# Patient Record
Sex: FEMALE | Race: WHITE | HISPANIC OR LATINO | Employment: FULL TIME | URBAN - METROPOLITAN AREA
[De-identification: names, ages, dates, MRNs, and addresses within clinical notes are randomized per-mention and may not be internally consistent; named-entity substitution may affect disease eponyms.]

---

## 2023-02-23 PROBLEM — F41.9 ANXIETY: Status: ACTIVE | Noted: 2023-02-23

## 2023-02-23 PROBLEM — T75.3XXA MOTION SICKNESS: Status: ACTIVE | Noted: 2023-02-23

## 2023-02-23 PROBLEM — E66.811 CLASS 1 OBESITY WITH BODY MASS INDEX (BMI) OF 32.0 TO 32.9 IN ADULT: Status: ACTIVE | Noted: 2023-02-23

## 2023-02-23 PROBLEM — E78.5 HYPERLIPIDEMIA: Status: ACTIVE | Noted: 2023-02-23

## 2023-02-23 PROBLEM — R07.89 ATYPICAL CHEST PAIN: Status: ACTIVE | Noted: 2023-02-23

## 2023-02-23 PROBLEM — E55.9 VITAMIN D DEFICIENCY: Status: ACTIVE | Noted: 2023-02-23

## 2023-02-23 PROBLEM — J30.9 ALLERGIC RHINITIS: Status: ACTIVE | Noted: 2023-02-23

## 2023-02-23 PROBLEM — E66.9 CLASS 1 OBESITY WITH BODY MASS INDEX (BMI) OF 32.0 TO 32.9 IN ADULT: Status: ACTIVE | Noted: 2023-02-23

## 2023-02-23 PROBLEM — L70.9 ACNE: Status: ACTIVE | Noted: 2023-02-23

## 2023-02-23 RX ORDER — SCOLOPAMINE TRANSDERMAL SYSTEM 1 MG/1
1 PATCH, EXTENDED RELEASE TRANSDERMAL
COMMUNITY
End: 2024-03-15 | Stop reason: SDUPTHER

## 2023-02-23 RX ORDER — BUPROPION HYDROCHLORIDE 150 MG/1
1 TABLET ORAL DAILY
COMMUNITY
End: 2023-03-06 | Stop reason: SINTOL

## 2023-02-23 RX ORDER — FLUTICASONE PROPIONATE 50 MCG
2 SPRAY, SUSPENSION (ML) NASAL DAILY
COMMUNITY
Start: 2018-05-30

## 2023-03-05 NOTE — PROGRESS NOTES
Subjective   Patient ID: Eda Sprague is a 37 y.o. female who presents for No chief complaint on file..    HPI     Review of Systems    Objective   There were no vitals taken for this visit.    Physical Exam    Assessment/Plan

## 2023-03-06 ENCOUNTER — OFFICE VISIT (OUTPATIENT)
Dept: PRIMARY CARE | Facility: CLINIC | Age: 38
End: 2023-03-06
Payer: COMMERCIAL

## 2023-03-06 VITALS
BODY MASS INDEX: 31.07 KG/M2 | WEIGHT: 217 LBS | SYSTOLIC BLOOD PRESSURE: 117 MMHG | OXYGEN SATURATION: 98 % | HEIGHT: 70 IN | DIASTOLIC BLOOD PRESSURE: 79 MMHG | HEART RATE: 88 BPM

## 2023-03-06 DIAGNOSIS — R53.83 FATIGUE, UNSPECIFIED TYPE: ICD-10-CM

## 2023-03-06 DIAGNOSIS — R00.0 TACHYCARDIA, UNSPECIFIED: Primary | ICD-10-CM

## 2023-03-06 PROCEDURE — 1036F TOBACCO NON-USER: CPT | Performed by: NURSE PRACTITIONER

## 2023-03-06 PROCEDURE — 99214 OFFICE O/P EST MOD 30 MIN: CPT | Performed by: NURSE PRACTITIONER

## 2023-03-06 ASSESSMENT — ENCOUNTER SYMPTOMS: DEPRESSION: 0

## 2023-03-06 NOTE — PATIENT INSTRUCTIONS
Call Cardiology [phone] for appointment  Recommend ambulatory ECG monitor x14 days  Follow-up as scheduled

## 2023-03-06 NOTE — PROGRESS NOTES
"Subjective   Patient ID: Eda Sprague is a 37 y.o. female who presents for episode of tachycardia while at [employer] with her children.  Watch showed heart rate in 130s.  States this has happened before.  Does feel tired with the episodes.  Will often nap on most days that this occurs.  Also reports vague chest discomfort on either side of chest, possibly related to anxiety.    Drinks approximately 3 cups of coffee in the morning.  Otherwise water.  No energy drinks  Otherwise stays away from caffeine.  HPI     Review of Systems   All other systems reviewed and are negative.      Objective   /79   Pulse 88   Ht 1.778 m (5' 10\")   Wt 98.4 kg (217 lb)   SpO2 98%   BMI 31.14 kg/m²     Physical Exam  Constitutional:       Appearance: Normal appearance.   Cardiovascular:      Rate and Rhythm: Normal rate and regular rhythm.      Heart sounds: No murmur heard.  Pulmonary:      Effort: Pulmonary effort is normal.   Neurological:      Mental Status: She is alert.         Assessment/Plan   Problem List Items Addressed This Visit    None  Visit Diagnoses       Tachycardia, unspecified    -  Primary    Relevant Orders    Referral to Cardiology    Fatigue, unspecified type                   "

## 2023-03-09 ENCOUNTER — APPOINTMENT (OUTPATIENT)
Dept: PRIMARY CARE | Facility: CLINIC | Age: 38
End: 2023-03-09
Payer: COMMERCIAL

## 2023-03-17 DIAGNOSIS — R92.8 ABNORMAL MAMMOGRAM: Primary | ICD-10-CM

## 2023-03-17 NOTE — PROGRESS NOTES
Spoke with patient regarding mammogram results.  Understands she needs ultrasound of left breast  Order for ultrasound placed and number given to patient for scheduling

## 2023-03-22 ENCOUNTER — APPOINTMENT (OUTPATIENT)
Dept: PRIMARY CARE | Facility: CLINIC | Age: 38
End: 2023-03-22
Payer: COMMERCIAL

## 2023-03-24 ENCOUNTER — APPOINTMENT (OUTPATIENT)
Dept: PRIMARY CARE | Facility: CLINIC | Age: 38
End: 2023-03-24
Payer: COMMERCIAL

## 2023-04-10 DIAGNOSIS — N63.20 MASS OF MULTIPLE SITES OF LEFT BREAST: Primary | ICD-10-CM

## 2023-04-10 NOTE — PROGRESS NOTES
Spoke with Eda.  She is aware she needs US guided biopsy following recent US.  They will be calling to schedule biopsy at [facility].

## 2023-11-26 DIAGNOSIS — N63.20 MASS OF LEFT BREAST, UNSPECIFIED QUADRANT: Primary | ICD-10-CM

## 2023-12-08 ENCOUNTER — APPOINTMENT (OUTPATIENT)
Dept: RADIOLOGY | Facility: CLINIC | Age: 38
End: 2023-12-08
Payer: COMMERCIAL

## 2024-01-29 ENCOUNTER — HOSPITAL ENCOUNTER (OUTPATIENT)
Dept: RADIOLOGY | Facility: CLINIC | Age: 39
Discharge: HOME | End: 2024-01-29
Payer: COMMERCIAL

## 2024-01-29 DIAGNOSIS — N63.20 MASS OF LEFT BREAST, UNSPECIFIED QUADRANT: ICD-10-CM

## 2024-01-29 PROCEDURE — 76642 ULTRASOUND BREAST LIMITED: CPT | Mod: LEFT SIDE | Performed by: STUDENT IN AN ORGANIZED HEALTH CARE EDUCATION/TRAINING PROGRAM

## 2024-01-29 PROCEDURE — 76982 USE 1ST TARGET LESION: CPT | Mod: LT

## 2024-01-29 PROCEDURE — 76642 ULTRASOUND BREAST LIMITED: CPT | Mod: LT

## 2024-01-30 DIAGNOSIS — N63.20 MASS OF LEFT BREAST, UNSPECIFIED QUADRANT: Primary | ICD-10-CM

## 2024-03-14 ENCOUNTER — OFFICE VISIT (OUTPATIENT)
Dept: OBSTETRICS AND GYNECOLOGY | Facility: CLINIC | Age: 39
End: 2024-03-14
Payer: COMMERCIAL

## 2024-03-14 VITALS
WEIGHT: 213 LBS | HEIGHT: 70 IN | DIASTOLIC BLOOD PRESSURE: 72 MMHG | BODY MASS INDEX: 30.49 KG/M2 | SYSTOLIC BLOOD PRESSURE: 110 MMHG

## 2024-03-14 DIAGNOSIS — N89.8 VAGINAL DISCHARGE: ICD-10-CM

## 2024-03-14 DIAGNOSIS — N93.9 ABNORMAL UTERINE BLEEDING (AUB): ICD-10-CM

## 2024-03-14 DIAGNOSIS — Z01.419 ENCOUNTER FOR ANNUAL ROUTINE GYNECOLOGICAL EXAMINATION: Primary | ICD-10-CM

## 2024-03-14 PROCEDURE — 99395 PREV VISIT EST AGE 18-39: CPT | Performed by: OBSTETRICS & GYNECOLOGY

## 2024-03-14 PROCEDURE — 1036F TOBACCO NON-USER: CPT | Performed by: OBSTETRICS & GYNECOLOGY

## 2024-03-14 PROCEDURE — 87205 SMEAR GRAM STAIN: CPT

## 2024-03-14 RX ORDER — DESOGESTREL AND ETHINYL ESTRADIOL 0.15-0.03
1 KIT ORAL DAILY
Qty: 84 TABLET | Refills: 3 | Status: SHIPPED | OUTPATIENT
Start: 2024-03-14 | End: 2025-03-14

## 2024-03-14 ASSESSMENT — PATIENT HEALTH QUESTIONNAIRE - PHQ9
1. LITTLE INTEREST OR PLEASURE IN DOING THINGS: NOT AT ALL
SUM OF ALL RESPONSES TO PHQ9 QUESTIONS 1 AND 2: 0
2. FEELING DOWN, DEPRESSED OR HOPELESS: NOT AT ALL

## 2024-03-14 ASSESSMENT — COLUMBIA-SUICIDE SEVERITY RATING SCALE - C-SSRS
1. IN THE PAST MONTH, HAVE YOU WISHED YOU WERE DEAD OR WISHED YOU COULD GO TO SLEEP AND NOT WAKE UP?: NO
6. HAVE YOU EVER DONE ANYTHING, STARTED TO DO ANYTHING, OR PREPARED TO DO ANYTHING TO END YOUR LIFE?: NO
2. HAVE YOU ACTUALLY HAD ANY THOUGHTS OF KILLING YOURSELF?: NO

## 2024-03-14 NOTE — PROGRESS NOTES
"Subjective    Eda Sprague is a 38 y.o. female who is here for a routine exam. Periods are regular. No intermenstrual bleeding, spotting, or discharge Every other month is heavy and painful.  She has tried multiple hormonal options in the past including an IUD without results.    Last pap: 12/2020 Negative, negative HPV  Last mammogram:  11/2023 L US probably benign, biopsy showed fibroadenoma, last mammogram 3/23    Review of Systems  Constitutional: no fever, no chills, no recent weight gain, no recent weight loss and no fatigue.   Eyes: no eye pain, no vision problems and no dryness of the eyes.   ENT: no hearing loss, no nosebleeds and no sinus congestion.   Cardiovascular: no chest pain, no palpitations and no orthopnea.   Respiratory: no shortness of breath, no cough and no wheezing.   Gastrointestinal: no abdominal pain, no constipation, no nausea, no diarrhea and no vomiting.   Genitourinary: no dysuria, no urinary incontinence, no vaginal dryness, no vaginal itching, no dyspareunia, no pelvic pain, no dysmenorrhea, no sexual problems, no change in urinary frequency, no vaginal discharge, no unexplained vaginal bleeding and no lesion/sore.   Musculoskeletal: no back pain, no joint swelling and no leg edema.   Integumentary: no rashes, no skin lesions, no nipple discharge, no breast pain and no breast lump.   Neurological: no headache, no numbness and no dizziness.   Psychiatric: no sleep disturbances, no anxiety and no depression.   Endocrine: no hot flashes, no loss of hair and no hirsutism.   Hematologic/Lymphatic: no swollen glands, no tendency for easy bleeding and no tendency for easy bruising.       Objective   Ht 1.778 m (5' 10\")   Wt 96.6 kg (213 lb)   LMP 02/22/2024   BMI 30.56 kg/m²        General:   Alert and oriented, in no acute distress   Neck: Supple. No visible thyromegaly.    Breast/Axilla: Normal to palpation bilaterally without masses, skin changes, or nipple discharge.  "   Abdomen: Soft, non-tender, without masses or organomegaly   Vulva: Normal architecture without erythema, masses, or lesions.    Vagina: Normal mucosa without lesions, masses, or atrophy. +yellow vaginal discharge.    Cervix: Normal without masses, lesions, or signs of cervicitis.    Uterus: Normal mobile, non-enlarged uterus    Adnexa: Normal without masses or lesions   Pelvic Floor No POP noted. No high tone pelvic floor    Psych Normal affect. Normal mood.          Assessment/Plan   Annual exam - discussed diet, exercise, self breast awareness, mammogram and pap guidelines.  AUB-HMB - leaning toward ablation, aware that she will need EMB prior and consult with Dr. Ott.  Would like to try OCPs again and then reassess after a few months. No CI identified.

## 2024-03-15 ENCOUNTER — OFFICE VISIT (OUTPATIENT)
Dept: PRIMARY CARE | Facility: CLINIC | Age: 39
End: 2024-03-15
Payer: COMMERCIAL

## 2024-03-15 ENCOUNTER — LAB (OUTPATIENT)
Dept: LAB | Facility: LAB | Age: 39
End: 2024-03-15
Payer: COMMERCIAL

## 2024-03-15 VITALS
DIASTOLIC BLOOD PRESSURE: 83 MMHG | WEIGHT: 217 LBS | HEIGHT: 70 IN | HEART RATE: 91 BPM | BODY MASS INDEX: 31.07 KG/M2 | RESPIRATION RATE: 20 BRPM | OXYGEN SATURATION: 100 % | SYSTOLIC BLOOD PRESSURE: 120 MMHG

## 2024-03-15 DIAGNOSIS — T75.3XXS MOTION SICKNESS, SEQUELA: ICD-10-CM

## 2024-03-15 DIAGNOSIS — E55.9 VITAMIN D DEFICIENCY: ICD-10-CM

## 2024-03-15 DIAGNOSIS — E78.00 ELEVATED LDL CHOLESTEROL LEVEL: ICD-10-CM

## 2024-03-15 DIAGNOSIS — Z00.00 HEALTHCARE MAINTENANCE: Primary | ICD-10-CM

## 2024-03-15 DIAGNOSIS — Z00.00 HEALTHCARE MAINTENANCE: ICD-10-CM

## 2024-03-15 DIAGNOSIS — E66.09 CLASS 1 OBESITY DUE TO EXCESS CALORIES WITHOUT SERIOUS COMORBIDITY WITH BODY MASS INDEX (BMI) OF 32.0 TO 32.9 IN ADULT: ICD-10-CM

## 2024-03-15 PROBLEM — N63.0 MASS OF BREAST: Status: RESOLVED | Noted: 2024-01-29 | Resolved: 2024-03-15

## 2024-03-15 PROBLEM — N93.9 ABNORMAL UTERINE BLEEDING: Status: ACTIVE | Noted: 2024-03-15

## 2024-03-15 PROBLEM — E78.5 HYPERLIPIDEMIA: Status: RESOLVED | Noted: 2023-02-23 | Resolved: 2024-03-15

## 2024-03-15 PROBLEM — R11.2 NAUSEA AND VOMITING: Status: RESOLVED | Noted: 2024-03-15 | Resolved: 2024-03-15

## 2024-03-15 PROBLEM — R92.8 ABNORMAL MAMMOGRAM: Status: ACTIVE | Noted: 2024-03-15

## 2024-03-15 PROBLEM — N89.8 VAGINAL DISCHARGE: Status: RESOLVED | Noted: 2024-03-15 | Resolved: 2024-03-15

## 2024-03-15 PROBLEM — R00.0 TACHYCARDIA: Status: RESOLVED | Noted: 2024-03-15 | Resolved: 2024-03-15

## 2024-03-15 LAB
25(OH)D3 SERPL-MCNC: 9 NG/ML (ref 30–100)
ALBUMIN SERPL BCP-MCNC: 4.7 G/DL (ref 3.4–5)
ALP SERPL-CCNC: 33 U/L (ref 33–110)
ALT SERPL W P-5'-P-CCNC: 16 U/L (ref 7–45)
ANION GAP SERPL CALC-SCNC: 13 MMOL/L (ref 10–20)
APPEARANCE UR: CLEAR
AST SERPL W P-5'-P-CCNC: 17 U/L (ref 9–39)
BASOPHILS # BLD AUTO: 0.06 X10*3/UL (ref 0–0.1)
BASOPHILS NFR BLD AUTO: 1 %
BILIRUB SERPL-MCNC: 0.3 MG/DL (ref 0–1.2)
BILIRUB UR STRIP.AUTO-MCNC: NEGATIVE MG/DL
BUN SERPL-MCNC: 8 MG/DL (ref 6–23)
CALCIUM SERPL-MCNC: 9.4 MG/DL (ref 8.6–10.3)
CHLORIDE SERPL-SCNC: 100 MMOL/L (ref 98–107)
CHOLEST SERPL-MCNC: 217 MG/DL (ref 0–199)
CHOLESTEROL/HDL RATIO: 2.5
CLUE CELLS VAG LPF-#/AREA: NORMAL /[LPF]
CO2 SERPL-SCNC: 28 MMOL/L (ref 21–32)
COLOR UR: YELLOW
CREAT SERPL-MCNC: 0.83 MG/DL (ref 0.5–1.05)
EGFRCR SERPLBLD CKD-EPI 2021: >90 ML/MIN/1.73M*2
EOSINOPHIL # BLD AUTO: 0.14 X10*3/UL (ref 0–0.7)
EOSINOPHIL NFR BLD AUTO: 2.4 %
ERYTHROCYTE [DISTWIDTH] IN BLOOD BY AUTOMATED COUNT: 12.7 % (ref 11.5–14.5)
EST. AVERAGE GLUCOSE BLD GHB EST-MCNC: 100 MG/DL
GLUCOSE SERPL-MCNC: 88 MG/DL (ref 74–99)
GLUCOSE UR STRIP.AUTO-MCNC: NEGATIVE MG/DL
HBA1C MFR BLD: 5.1 %
HCT VFR BLD AUTO: 44.1 % (ref 36–46)
HDLC SERPL-MCNC: 86.4 MG/DL
HGB BLD-MCNC: 14.3 G/DL (ref 12–16)
IMM GRANULOCYTES # BLD AUTO: 0.02 X10*3/UL (ref 0–0.7)
IMM GRANULOCYTES NFR BLD AUTO: 0.3 % (ref 0–0.9)
KETONES UR STRIP.AUTO-MCNC: NEGATIVE MG/DL
LDLC SERPL CALC-MCNC: 113 MG/DL
LEUKOCYTE ESTERASE UR QL STRIP.AUTO: ABNORMAL
LYMPHOCYTES # BLD AUTO: 1.54 X10*3/UL (ref 1.2–4.8)
LYMPHOCYTES NFR BLD AUTO: 26.5 %
MCH RBC QN AUTO: 30.6 PG (ref 26–34)
MCHC RBC AUTO-ENTMCNC: 32.4 G/DL (ref 32–36)
MCV RBC AUTO: 94 FL (ref 80–100)
MONOCYTES # BLD AUTO: 0.6 X10*3/UL (ref 0.1–1)
MONOCYTES NFR BLD AUTO: 10.3 %
MUCOUS THREADS #/AREA URNS AUTO: ABNORMAL /LPF
NEUTROPHILS # BLD AUTO: 3.45 X10*3/UL (ref 1.2–7.7)
NEUTROPHILS NFR BLD AUTO: 59.5 %
NITRITE UR QL STRIP.AUTO: NEGATIVE
NON HDL CHOLESTEROL: 131 MG/DL (ref 0–149)
NRBC BLD-RTO: 0 /100 WBCS (ref 0–0)
NUGENT SCORE: 3
PH UR STRIP.AUTO: 7 [PH]
PLATELET # BLD AUTO: 269 X10*3/UL (ref 150–450)
POTASSIUM SERPL-SCNC: 4.6 MMOL/L (ref 3.5–5.3)
PROT SERPL-MCNC: 7.4 G/DL (ref 6.4–8.2)
PROT UR STRIP.AUTO-MCNC: NEGATIVE MG/DL
RBC # BLD AUTO: 4.68 X10*6/UL (ref 4–5.2)
RBC # UR STRIP.AUTO: NEGATIVE /UL
RBC #/AREA URNS AUTO: ABNORMAL /HPF
SODIUM SERPL-SCNC: 136 MMOL/L (ref 136–145)
SP GR UR STRIP.AUTO: 1.01
SQUAMOUS #/AREA URNS AUTO: ABNORMAL /HPF
TRIGL SERPL-MCNC: 88 MG/DL (ref 0–149)
TSH SERPL-ACNC: 1.85 MIU/L (ref 0.44–3.98)
UROBILINOGEN UR STRIP.AUTO-MCNC: <2 MG/DL
VLDL: 18 MG/DL (ref 0–40)
WBC # BLD AUTO: 5.8 X10*3/UL (ref 4.4–11.3)
WBC #/AREA URNS AUTO: ABNORMAL /HPF
YEAST VAG WET PREP-#/AREA: NORMAL

## 2024-03-15 PROCEDURE — 84443 ASSAY THYROID STIM HORMONE: CPT

## 2024-03-15 PROCEDURE — 80053 COMPREHEN METABOLIC PANEL: CPT

## 2024-03-15 PROCEDURE — 80061 LIPID PANEL: CPT

## 2024-03-15 PROCEDURE — 36415 COLL VENOUS BLD VENIPUNCTURE: CPT

## 2024-03-15 PROCEDURE — 1036F TOBACCO NON-USER: CPT

## 2024-03-15 PROCEDURE — 81001 URINALYSIS AUTO W/SCOPE: CPT

## 2024-03-15 PROCEDURE — 82306 VITAMIN D 25 HYDROXY: CPT

## 2024-03-15 PROCEDURE — 3008F BODY MASS INDEX DOCD: CPT

## 2024-03-15 PROCEDURE — 85025 COMPLETE CBC W/AUTO DIFF WBC: CPT

## 2024-03-15 PROCEDURE — 83036 HEMOGLOBIN GLYCOSYLATED A1C: CPT

## 2024-03-15 PROCEDURE — 99395 PREV VISIT EST AGE 18-39: CPT

## 2024-03-15 RX ORDER — SCOLOPAMINE TRANSDERMAL SYSTEM 1 MG/1
1 PATCH, EXTENDED RELEASE TRANSDERMAL
Qty: 24 PATCH | Refills: 0 | Status: SHIPPED | OUTPATIENT
Start: 2024-03-15

## 2024-03-15 ASSESSMENT — ENCOUNTER SYMPTOMS
SORE THROAT: 0
PALPITATIONS: 0
RECTAL PAIN: 0
RHINORRHEA: 0
FEVER: 0
ANAL BLEEDING: 0
DIAPHORESIS: 0
BLOOD IN STOOL: 0
MUSCULOSKELETAL NEGATIVE: 1
DIFFICULTY URINATING: 0
CONFUSION: 0
ENDOCRINE NEGATIVE: 1
JOINT SWELLING: 0
LIGHT-HEADEDNESS: 0
STRIDOR: 0
OCCASIONAL FEELINGS OF UNSTEADINESS: 0
DEPRESSION: 0
BRUISES/BLEEDS EASILY: 0
DIARRHEA: 0
CONSTIPATION: 0
DYSPHORIC MOOD: 0
WEAKNESS: 0
FLANK PAIN: 0
AGITATION: 0
DYSURIA: 0
NEUROLOGICAL NEGATIVE: 1
APNEA: 0
HEADACHES: 0
VOICE CHANGE: 0
SINUS PRESSURE: 0
FATIGUE: 0
POLYDIPSIA: 0
WHEEZING: 0
APPETITE CHANGE: 0
LOSS OF SENSATION IN FEET: 0
PSYCHIATRIC NEGATIVE: 1
CONSTITUTIONAL NEGATIVE: 1
HYPERACTIVE: 0
MYALGIAS: 0
CARDIOVASCULAR NEGATIVE: 1
SHORTNESS OF BREATH: 0
EYES NEGATIVE: 1
DIZZINESS: 0
NAUSEA: 0
FREQUENCY: 0
CHEST TIGHTNESS: 0
GASTROINTESTINAL NEGATIVE: 1
SLEEP DISTURBANCE: 0
COUGH: 0
TREMORS: 0
HEMATOLOGIC/LYMPHATIC NEGATIVE: 1
HEMATURIA: 0
NUMBNESS: 0
NECK STIFFNESS: 0
RESPIRATORY NEGATIVE: 1
NERVOUS/ANXIOUS: 0
ABDOMINAL PAIN: 0
COLOR CHANGE: 0
SPEECH DIFFICULTY: 0
ACTIVITY CHANGE: 0
ABDOMINAL DISTENTION: 0
EYE DISCHARGE: 0
PHOTOPHOBIA: 0
TROUBLE SWALLOWING: 0
UNEXPECTED WEIGHT CHANGE: 0
NECK PAIN: 0
POLYPHAGIA: 0
BACK PAIN: 0
SEIZURES: 0
CHILLS: 0

## 2024-03-15 ASSESSMENT — PATIENT HEALTH QUESTIONNAIRE - PHQ9
1. LITTLE INTEREST OR PLEASURE IN DOING THINGS: NOT AT ALL
2. FEELING DOWN, DEPRESSED OR HOPELESS: NOT AT ALL
SUM OF ALL RESPONSES TO PHQ9 QUESTIONS 1 AND 2: 0

## 2024-03-15 NOTE — PROGRESS NOTES
Primary Care Provider: Moriah Alonso, ESTHER-CNP    Subjective   Eda PINEDA Radha Sprague is a 38 y.o. female who presents for New Patient Visit and Annual Exam (No major concerns ).    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  Last pap: 12/2020 Negative, negative HPV-follows with OB/GYN  Last mammogram:  11/2023 L US probably benign, biopsy showed fibroadenoma, last mammogram 1/2024  Colonoscopy due at age 44 unless otherwise indicated  Drnaiwvnyodwf-ox-ln-date with the exception of the newest COVID and flu vaccine, however she did get COVID back in December which is why she did not get the most recent COVID-vaccine    No chest pain, no shortness of breath, no dizziness  Bowel movements regular, no trouble urinating  Energy level is good    Elevated LDL, there is a history of vitamin D deficiency in her chart however she is unsure of this  Mom and grandma with history of atrial fibrillation, father with history of prostate cancer  Motion sickness-travels a lot goes on cruises           Review of Systems   Constitutional: Negative.  Negative for activity change, appetite change, chills, diaphoresis, fatigue, fever and unexpected weight change.   HENT: Negative.  Negative for congestion, dental problem, ear discharge, ear pain, hearing loss, mouth sores, nosebleeds, postnasal drip, rhinorrhea, sinus pressure, sneezing, sore throat, tinnitus, trouble swallowing and voice change.    Eyes: Negative.  Negative for photophobia, discharge and visual disturbance.   Respiratory: Negative.  Negative for apnea, cough, chest tightness, shortness of breath, wheezing and stridor.    Cardiovascular: Negative.  Negative for chest pain, palpitations and leg swelling.  "  Gastrointestinal: Negative.  Negative for abdominal distention, abdominal pain, anal bleeding, blood in stool, constipation, diarrhea, nausea and rectal pain.   Endocrine: Negative.  Negative for cold intolerance, heat intolerance, polydipsia, polyphagia and polyuria.   Genitourinary: Negative.  Negative for decreased urine volume, difficulty urinating, dysuria, flank pain, frequency, hematuria and urgency.   Musculoskeletal: Negative.  Negative for back pain, gait problem, joint swelling, myalgias, neck pain and neck stiffness.   Skin: Negative.  Negative for color change and rash.   Neurological: Negative.  Negative for dizziness, tremors, seizures, syncope, speech difficulty, weakness, light-headedness, numbness and headaches.   Hematological: Negative.  Does not bruise/bleed easily.   Psychiatric/Behavioral: Negative.  Negative for agitation, confusion, dysphoric mood, sleep disturbance and suicidal ideas. The patient is not nervous/anxious and is not hyperactive.    All other systems reviewed and are negative.        Objective   /83   Pulse 91   Resp 20   Ht 1.778 m (5' 10\")   Wt 98.4 kg (217 lb)   LMP 02/22/2024   SpO2 100%   BMI 31.14 kg/m²     Physical Exam  Vitals reviewed.   Constitutional:       General: She is not in acute distress.     Appearance: Normal appearance. She is normal weight. She is not ill-appearing, toxic-appearing or diaphoretic.   HENT:      Head: Normocephalic and atraumatic.      Right Ear: Tympanic membrane, ear canal and external ear normal.      Left Ear: Tympanic membrane, ear canal and external ear normal.      Nose: Nose normal.   Eyes:      Extraocular Movements: Extraocular movements intact.      Conjunctiva/sclera: Conjunctivae normal.      Pupils: Pupils are equal, round, and reactive to light.   Neck:      Vascular: No carotid bruit.   Cardiovascular:      Rate and Rhythm: Normal rate and regular rhythm.      Pulses: Normal pulses.      Heart sounds: Normal " heart sounds. No murmur heard.     No friction rub. No gallop.   Pulmonary:      Effort: Pulmonary effort is normal. No respiratory distress.      Breath sounds: Normal breath sounds.   Abdominal:      General: Abdomen is flat. Bowel sounds are normal.      Palpations: Abdomen is soft.   Musculoskeletal:         General: Normal range of motion.      Cervical back: Normal range of motion and neck supple.   Lymphadenopathy:      Cervical: No cervical adenopathy.   Skin:     General: Skin is warm and dry.      Capillary Refill: Capillary refill takes less than 2 seconds.   Neurological:      General: No focal deficit present.      Mental Status: She is alert and oriented to person, place, and time. Mental status is at baseline.   Psychiatric:         Mood and Affect: Mood normal.         Behavior: Behavior normal.         Thought Content: Thought content normal.         Judgment: Judgment normal.         Assessment/Plan   Problem List Items Addressed This Visit      Patient visit/establish care/CPE   Motion sickness    Stable can continue with the scopolamine patches  Relevant Medications    scopolamine (Transderm-Scop) 1 mg over 3 days patch 3 day    Class 1 obesity with body mass index (BMI) of 32.0 to 32.9 in adult    Stable, continue working on healthier eating and regular exercise  Relevant Orders    Vitamin D 25-Hydroxy,Total (for eval of Vitamin D levels)    Lipid Panel    Hemoglobin A1C    TSH with reflex to Free T4 if abnormal    CBC and Auto Differential    Comprehensive metabolic panel    Urinalysis with Reflex Microscopic    Vitamin D deficiency    Stable, recheck labs to see if this is still current  Relevant Orders    Vitamin D 25-Hydroxy,Total (for eval of Vitamin D levels)    Lipid Panel    Hemoglobin A1C    TSH with reflex to Free T4 if abnormal    CBC and Auto Differential    Comprehensive metabolic panel    Urinalysis with Reflex Microscopic    Elevated LDL cholesterol level    Stable, recheck labs,  continue with low-cholesterol diet  Relevant Orders    Vitamin D 25-Hydroxy,Total (for eval of Vitamin D levels)    Lipid Panel    Hemoglobin A1C    TSH with reflex to Free T4 if abnormal    CBC and Auto Differential    Comprehensive metabolic panel    Urinalysis with Reflex Microscopic    Healthcare maintenance - Primary    Dental exams-every 6 months, up-to-date  Eye exam up-to-date  Exercises regularly, currently training for a 5K, well-balanced diet  travels a lot for work and for medications-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[employer]  Last pap: 12/2020 Negative, negative HPV-follows with OB/GYN  Last mammogram:  11/2023 L US probably benign, biopsy showed fibroadenoma, last mammogram 1/2024  Colonoscopy due at age 44 unless otherwise indicated  Zvaueoaevcfgn-gi-uk-date with the exception of the newest COVID and flu   Continue with regular exercise and healthy eating  Relevant Orders    Vitamin D 25-Hydroxy,Total (for eval of Vitamin D levels)    Lipid Panel    Hemoglobin A1C    TSH with reflex to Free T4 if abnormal    CBC and Auto Differential    Comprehensive metabolic panel    Urinalysis with Reflex Microscopic       Follow-up with annual wellness exam and as needed

## 2024-03-18 DIAGNOSIS — E55.9 VITAMIN D DEFICIENCY: Primary | ICD-10-CM

## 2024-03-18 RX ORDER — ERGOCALCIFEROL 1.25 MG/1
50000 CAPSULE ORAL
Qty: 12 CAPSULE | Refills: 0 | Status: SHIPPED | OUTPATIENT
Start: 2024-03-18 | End: 2024-06-10

## 2024-07-16 NOTE — PROGRESS NOTES
Eda Sprague female   1985 39 y.o.   [id number]      Chief Complaint  Annual mammogram and exam, left breast masses    History Of Present Illness  Eda Sprague is a very pleasant 39 year old  woman [facility]. She is here with her wife, Tricia. She has family history of breast cancer in her maternal great grandmother, age 75. She denies breast surgery. She underwent a left breast core biopsy in 2023, benign fibroadenoma. She denies any new masses or lumps.      BREAST IMAGING: 3/16/2023 Bilateral screening mammogram, indicates BI-RADS Category 0. Left breast mass warranting additional views. 4/10/2023 Left breast ultrasound, indicates BI-RADS Category 4. Left breast, 6:00, 3 cm from the nipple, multiple subcentimeter masses measuring up to 2.9 cm. The largest measures 1.3 x 0.8 x 0.6 cm with internal vascularity and soft on elastography. Ultrasound guided biopsy recommended. 2024 Left breast ultrasound, indicates BI-RADS Category 3. Left breast, stable probably benign masses warranting short term follow up.      FEMALE HISTORY: menarche age 11, , first birth age 26,  x 3 months, OCP's x 10-20 years, premenopausal, regular monthly cycles, heterogeneously dense tissue     FAMILY CANCER HISTORY:  Father: Prostate cancer, age 60  Paternal Grandfather: Lung cancer, age 58, smoker  Maternal Uncle: Prostate cancer, age 68  Maternal Great Grandmother: Breast cancer, age 75      Surgical History  She has a past surgical history that includes Breast biopsy (Left, 2023).     Social History  She reports that she has never smoked. She has never used smokeless tobacco. She reports current alcohol use of about 15.0 standard drinks of alcohol per week. She reports that she does not use drugs.    Family History  Family History   Problem Relation Name Age of Onset    Anxiety disorder Mother Jessica     Hyperthyroidism Mother Jessica      Hypothyroidism Mother Jessica     Arthritis Mother Jessica     Atrial fibrillation Mother Jessica     Prostate cancer Father Bradley     Hyperlipidemia Father Bradley     Hypothyroidism Cousin      Other (cardiac disorder) Other grandmother     Hypertension Other grandfather     Cancer Other grandfather         Allergies  Cefaclor and Cephalexin    Medications  Current Outpatient Medications   Medication Instructions    desogestreL-ethinyl estradioL (Apri) 0.15-0.03 mg tablet 1 tablet, oral, Daily    fluticasone (Flonase) 50 mcg/actuation nasal spray 2 sprays, nasal, Daily    scopolamine (Transderm-Scop) 1 mg over 3 days patch 3 day 1 patch, transdermal, Every 72 hours         REVIEW OF SYSTEMS    Constitutional:  Negative for appetite change, fatigue, fever and unexpected weight change.   HENT:  Negative for ear pain, hearing loss, nosebleeds, sore throat and trouble swallowing.    Eyes:  Negative for discharge, itching and visual disturbance.   Respiratory:  Negative for cough, chest tightness and shortness of breath.    Cardiovascular:  Negative for chest pain, palpitations and leg swelling.   Breast: as indicated in HPI  Gastrointestinal:  Negative for abdominal pain, constipation, diarrhea and nausea.   Endocrine: Negative for cold intolerance and heat intolerance.   Genitourinary:  Negative for dysuria, frequency, hematuria, pelvic pain and vaginal bleeding.   Musculoskeletal:  Negative for arthralgias, back pain, gait problem, joint swelling and myalgias.   Skin:  Negative for color change and rash.   Allergic/Immunologic: Negative for environmental allergies and food allergies.   Neurological:  Negative for dizziness, tremors, speech difficulty, weakness, numbness and headaches.   Hematological:  Does not bruise/bleed easily.   Psychiatric/Behavioral:  Negative for agitation, dysphoric mood and sleep disturbance. The patient is not nervous/anxious.         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     Physical Exam  Patient is alert and oriented x3 and in a relaxed and appropriate mood. Her gait is steady and hand grasps are equal. Sclera is clear. The breasts are nearly symmetrical. The right breast tissue is soft without palpable abnormalities, discrete nodules or masses. The left breast, 6:30, 3 cm from the nipple, 1.5 x 1.0 cm vague soft mass. The remaining left breast tissue is soft without palpable abnormalities, discrete nodules or masses. The skin and nipples appear normal. Both nipples are inverted, normal per patient. There is no cervical, supraclavicular or axillary lymphadenopathy. Heart rate and rhythm normal, S1 and S2 appreciated. The lungs are clear to auscultation bilaterally. Abdomen is soft and non-tender.     Physical Exam  Chest:              Last Recorded Vitals  Vitals:    07/29/24 0949   BP: 124/84   Pulse: 76   Resp: 18   Temp: 36.6 °C (97.9 °F)   SpO2: 100%       Relevant Results   Time was spent viewing digital images of the radiology testing with the patient. I explained the results in depth, along with suggested explanation for follow up recommendations based on the testing results. BI-RADS Category 2    Imaging       Narrative & Impression   Interpreted By:  Garret Lopez,   STUDY:  BI US BREAST  LIMITED LEFT; BI MAMMO BILATERAL DIAGNOSTIC  TOMOSYNTHESIS;  7/29/2024 9:26 am; 7/29/2024 8:47 am      ACCESSION NUMBER(S):  [id number]; [id number]      ORDERING CLINICIAN:  ELLIOTT VAZQUEZ      INDICATION:  Follow-up for probably benign finding in the left breast.      COMPARISON:  Mammogram dated 03/16/2023 and ultrasound dated 01/29/2024      FINDINGS:  MAMMOGRAPHY: 2D and tomosynthesis images were reviewed at 1 mm slice  thickness.      Density:  The breast tissue is heterogeneously dense, which may  obscure small masses.      Mammographically, there are no new suspicious masses, calcifications,  or areas of distortion noted. Multiple bilateral circumscribed masses  are noted again.      ULTRASOUND: Targeted ultrasound was performed of the left by a  registered sonographer with elastography. Sonographic evaluation was  performed for the probably benign finding in the left breast. This is  seen today at the 6 o'clock position 3 cm from the nipple. There is  considerable variation in the technique and index seen of this  finding. However in greatest dimension in the mass demonstrates an  interval decrease in size. The cine images demonstrate a collection  of interconnected cystic findings. The finding in total is still  avascular and soft. Considering the decrease in size is compatible  with a benign process. In greatest dimension today it measures 1.6  cm. Previously it measured 2.9 cm in greatest dimension.      IMPRESSION:  Previously described finding has decreased in size and  sonographically is suggestive of a cluster of complicated cysts. The  interval decrease in size is compatible with a benign process. No  further imaging follow-up is needed. The patient can return to annual  screening.      BI-RADS CATEGORY:  BI-RADS Category:  2 Benign.  Recommendation:  Annual Screening.  Recommended Date:  1 Year.         Assessment/Plan   Normal clinical exam and imaging, left breast masses, stable and benign,  family history of breast cancer, heterogeneously dense tissue     Plan: Return to PCP for annual mammograms.      Patient Discussion/Summary  Your clinical examination and imaging are normal. You no longer need to be seen by a breast specialist for an annual physical breast examination. It is important to continue annual screening mammograms and breast exams through your primary care provider. Please return to see me if you have a new breast problem or abnormal mammogram. It has been a pleasure having you as a patient.     You can see your health information, review clinical summaries from office visits & test results online when you follow your health with MY  Chart, a personal health record. To sign up go to [email or web address]. If you need assistance with signing up or trouble getting into your account call [employer] Patient Line 24/7 at [phone].    My office phone number is [phone] if you need to get in touch with me or have additional questions or concerns. Thank you for choosing [facility] and trusting me as your healthcare provider. I am honored to be a provider on your health care team and I remain dedicated to helping you achieve your health goals.       Coral Chang, APRN-CNP

## 2024-07-29 ENCOUNTER — HOSPITAL ENCOUNTER (OUTPATIENT)
Dept: RADIOLOGY | Facility: CLINIC | Age: 39
Discharge: HOME | End: 2024-07-29
Payer: COMMERCIAL

## 2024-07-29 ENCOUNTER — OFFICE VISIT (OUTPATIENT)
Dept: SURGICAL ONCOLOGY | Facility: CLINIC | Age: 39
End: 2024-07-29
Payer: COMMERCIAL

## 2024-07-29 VITALS
TEMPERATURE: 97.9 F | RESPIRATION RATE: 18 BRPM | OXYGEN SATURATION: 100 % | HEART RATE: 76 BPM | WEIGHT: 220.46 LBS | BODY MASS INDEX: 31.63 KG/M2 | SYSTOLIC BLOOD PRESSURE: 124 MMHG | DIASTOLIC BLOOD PRESSURE: 84 MMHG

## 2024-07-29 VITALS — HEIGHT: 70 IN | WEIGHT: 216.93 LBS | BODY MASS INDEX: 31.06 KG/M2

## 2024-07-29 DIAGNOSIS — R92.8 OTHER ABNORMAL AND INCONCLUSIVE FINDINGS ON DIAGNOSTIC IMAGING OF BREAST: ICD-10-CM

## 2024-07-29 DIAGNOSIS — N63.20 MASS OF LEFT BREAST, UNSPECIFIED QUADRANT: ICD-10-CM

## 2024-07-29 DIAGNOSIS — R92.8 ABNORMAL MAMMOGRAM: Primary | ICD-10-CM

## 2024-07-29 PROCEDURE — 77062 BREAST TOMOSYNTHESIS BI: CPT

## 2024-07-29 PROCEDURE — 99213 OFFICE O/P EST LOW 20 MIN: CPT | Performed by: NURSE PRACTITIONER

## 2024-07-29 PROCEDURE — 76642 ULTRASOUND BREAST LIMITED: CPT | Mod: LEFT SIDE | Performed by: RADIOLOGY

## 2024-07-29 PROCEDURE — 76642 ULTRASOUND BREAST LIMITED: CPT | Mod: LT

## 2024-07-29 PROCEDURE — 1036F TOBACCO NON-USER: CPT | Performed by: NURSE PRACTITIONER

## 2024-07-29 PROCEDURE — 76982 USE 1ST TARGET LESION: CPT | Mod: LT,RT

## 2024-07-29 PROCEDURE — 77062 BREAST TOMOSYNTHESIS BI: CPT | Mod: LEFT SIDE | Performed by: RADIOLOGY

## 2024-07-29 PROCEDURE — 77066 DX MAMMO INCL CAD BI: CPT | Mod: LEFT SIDE | Performed by: RADIOLOGY

## 2024-07-29 ASSESSMENT — PAIN SCALES - GENERAL: PAINLEVEL: 0-NO PAIN

## 2024-07-29 ASSESSMENT — ENCOUNTER SYMPTOMS
LOSS OF SENSATION IN FEET: 0
DEPRESSION: 0
OCCASIONAL FEELINGS OF UNSTEADINESS: 0

## 2024-07-29 NOTE — PATIENT INSTRUCTIONS
Your clinical examination and imaging are normal. You no longer need to be seen by a breast specialist for an annual physical breast examination. It is important to continue annual screening mammograms and breast exams through your primary care provider. Please return to see me if you have a new breast problem or abnormal mammogram. It has been a pleasure having you as a patient.     You can see your health information, review clinical summaries from office visits & test results online when you follow your health with MY  Chart, a personal health record. To sign up go to [email or web address]. If you need assistance with signing up or trouble getting into your account call [employer] Patient Line 24/7 at [phone].    My office phone number is [phone] if you need to get in touch with me or have additional questions or concerns. Thank you for choosing [facility] and trusting me as your healthcare provider. I am honored to be a provider on your health care team and I remain dedicated to helping you achieve your health goals.

## 2024-07-30 ENCOUNTER — APPOINTMENT (OUTPATIENT)
Dept: RADIOLOGY | Facility: CLINIC | Age: 39
End: 2024-07-30
Payer: COMMERCIAL

## 2024-08-02 ENCOUNTER — OFFICE VISIT (OUTPATIENT)
Dept: CARDIOLOGY | Facility: HOSPITAL | Age: 39
End: 2024-08-02
Payer: COMMERCIAL

## 2024-08-02 VITALS
BODY MASS INDEX: 31.28 KG/M2 | HEART RATE: 85 BPM | OXYGEN SATURATION: 99 % | SYSTOLIC BLOOD PRESSURE: 125 MMHG | DIASTOLIC BLOOD PRESSURE: 85 MMHG | HEIGHT: 70 IN | WEIGHT: 218.48 LBS

## 2024-08-02 DIAGNOSIS — R00.2 PALPITATIONS: ICD-10-CM

## 2024-08-02 PROCEDURE — 99213 OFFICE O/P EST LOW 20 MIN: CPT | Performed by: NURSE PRACTITIONER

## 2024-08-02 PROCEDURE — 93005 ELECTROCARDIOGRAM TRACING: CPT | Performed by: NURSE PRACTITIONER

## 2024-08-02 PROCEDURE — 3008F BODY MASS INDEX DOCD: CPT | Performed by: NURSE PRACTITIONER

## 2024-08-02 PROCEDURE — 1036F TOBACCO NON-USER: CPT | Performed by: NURSE PRACTITIONER

## 2024-08-02 ASSESSMENT — ENCOUNTER SYMPTOMS
EYES NEGATIVE: 1
RESPIRATORY NEGATIVE: 1
ENDOCRINE NEGATIVE: 1
PSYCHIATRIC NEGATIVE: 1
HEMATOLOGIC/LYMPHATIC NEGATIVE: 1
GASTROINTESTINAL NEGATIVE: 1
MUSCULOSKELETAL NEGATIVE: 1
PALPITATIONS: 1
CONSTITUTIONAL NEGATIVE: 1
NEUROLOGICAL NEGATIVE: 1
DEPRESSION: 0

## 2024-08-02 ASSESSMENT — PATIENT HEALTH QUESTIONNAIRE - PHQ9
SUM OF ALL RESPONSES TO PHQ9 QUESTIONS 1 AND 2: 0
1. LITTLE INTEREST OR PLEASURE IN DOING THINGS: NOT AT ALL
2. FEELING DOWN, DEPRESSED OR HOPELESS: NOT AT ALL

## 2024-08-02 NOTE — PATIENT INSTRUCTIONS
CALL WITH ANY QUESTIONS   NO MEDICATION CHANGES   HEART MONITOR FOR TWO WEEKS   WILL CALL TO REVIEW THE RESULTS

## 2024-08-02 NOTE — PROGRESS NOTES
Referred by Dr. Ailyn louis. provider found for Follow-up and Palpitations     History Of Present Illness:    Eda Sprague is a very pleasant 39 year old female with a history of HLD, she is here for a follow up visit. The patient is seen in collaboration with Dr. Lares.  Mrs Sprague complains of heart palpitations states it feels off. Heart rate elevated after the palpitations. Has some shortness of breath with it. Feels it more at night. Denies chest pain.     Review of Systems   Constitutional: Negative.   HENT: Negative.     Eyes: Negative.    Cardiovascular:  Positive for palpitations.   Respiratory: Negative.     Endocrine: Negative.    Hematologic/Lymphatic: Negative.    Skin: Negative.    Musculoskeletal: Negative.    Gastrointestinal: Negative.    Neurological: Negative.    Psychiatric/Behavioral: Negative.        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    Past Surgical History:  She has a past surgical history that includes Breast biopsy (Left, April 2023).      Social History:  She reports that she has never smoked. She has never used smokeless tobacco. She reports current alcohol use of about 15.0 standard drinks of alcohol per week. She reports that she does not use drugs.    Family  "History:  Family History   Problem Relation Name Age of Onset    Anxiety disorder Mother Jessica     Hyperthyroidism Mother Jessica     Hypothyroidism Mother Jessica     Arthritis Mother Jessica     Atrial fibrillation Mother Jessica     Prostate cancer Father Bradley     Hyperlipidemia Father Bradley     Hypothyroidism Cousin      Other (cardiac disorder) Other grandmother     Hypertension Other grandfather     Cancer Other grandfather         Allergies:  Cefaclor and Cephalexin    Outpatient Medications:  Current Outpatient Medications   Medication Instructions    desogestreL-ethinyl estradioL (Apri) 0.15-0.03 mg tablet 1 tablet, oral, Daily    fluticasone (Flonase) 50 mcg/actuation nasal spray 2 sprays, nasal, Daily    scopolamine (Transderm-Scop) 1 mg over 3 days patch 3 day 1 patch, transdermal, Every 72 hours        Last Recorded Vitals:  Vitals:    08/02/24 1458   BP: 125/85   BP Location: Left arm   Patient Position: Sitting   BP Cuff Size: Large adult   Pulse: 85   SpO2: 99%   Weight: 99.1 kg (218 lb 7.6 oz)   Height: 1.778 m (5' 10\")       Physical Exam:  Physical Exam  Vitals reviewed.   HENT:      Head: Normocephalic.      Nose: Nose normal.   Eyes:      Pupils: Pupils are equal, round, and reactive to light.   Cardiovascular:      Rate and Rhythm: Normal rate and regular rhythm.   Pulmonary:      Effort: Pulmonary effort is normal.      Breath sounds: Normal breath sounds.   Abdominal:      General: Abdomen is flat.      Palpations: Abdomen is soft.   Musculoskeletal:         General: Normal range of motion.      Cervical back: Normal range of motion.   Skin:     General: Skin is warm and dry.   Neurological:      General: No focal deficit present.      Mental Status: He is alert and oriented to person, place, and time.   Psychiatric:         Mood and Affect: Mood normal.            Last Labs:  CBC -  Lab Results   Component Value Date    WBC 5.8 03/15/2024    HGB 14.3 03/15/2024    HCT 44.1 03/15/2024    MCV 94 " 03/15/2024     03/15/2024       CMP -  Lab Results   Component Value Date    CALCIUM 9.4 03/15/2024    PROT 7.4 03/15/2024    ALBUMIN 4.7 03/15/2024    AST 17 03/15/2024    ALT 16 03/15/2024    ALKPHOS 33 03/15/2024    BILITOT 0.3 03/15/2024       LIPID PANEL -   Lab Results   Component Value Date    CHOL 217 (H) 03/15/2024    TRIG 88 03/15/2024    HDL 86.4 03/15/2024    CHHDL 2.5 03/15/2024    LDLF 106 (H) 10/01/2020    VLDL 18 03/15/2024    NHDL 131 03/15/2024       RENAL FUNCTION PANEL -   Lab Results   Component Value Date    GLUCOSE 88 03/15/2024     03/15/2024    K 4.6 03/15/2024     03/15/2024    CO2 28 03/15/2024    ANIONGAP 13 03/15/2024    BUN 8 03/15/2024    CREATININE 0.83 03/15/2024    CALCIUM 9.4 03/15/2024    ALBUMIN 4.7 03/15/2024        Lab Results   Component Value Date    HGBA1C 5.1 03/15/2024       Last Cardiology Tests:  ECG:  EKG independently reviewed from today showed sinus rhythm heart rate 74 bpm   Echo:    Ejection Fractions:    Cath:    Stress Test:        Cardiac Imaging:      Assessment/Plan      Mrs. Garcia is a very pleasant 39 year old female with a history of HLD and anxiety, she complains of heart palpitations, she notices it more at night. Has been occurring on a regular basis. She will have a monitor to assess the palpitations. We did discuss adding Metoprolol ER 12.5 mg daily, she wanted to have the monitor placed first.  Heart rate and blood pressure are well controlled today.      Plan   -call with any questions   -heart monitor for two weeks   -will call to review the results            ESTHER Ascencio-CNP

## 2024-08-09 LAB
ATRIAL RATE: 74 BPM
P AXIS: 32 DEGREES
P OFFSET: 198 MS
P ONSET: 147 MS
PR INTERVAL: 148 MS
Q ONSET: 221 MS
QRS COUNT: 12 BEATS
QRS DURATION: 86 MS
QT INTERVAL: 366 MS
QTC CALCULATION(BAZETT): 406 MS
QTC FREDERICIA: 392 MS
R AXIS: 48 DEGREES
T AXIS: 40 DEGREES
T OFFSET: 404 MS
VENTRICULAR RATE: 74 BPM

## 2024-08-12 ENCOUNTER — APPOINTMENT (OUTPATIENT)
Dept: CARDIOLOGY | Facility: HOSPITAL | Age: 39
End: 2024-08-12
Payer: COMMERCIAL

## 2024-08-13 ENCOUNTER — HOSPITAL ENCOUNTER (OUTPATIENT)
Dept: CARDIOLOGY | Facility: HOSPITAL | Age: 39
Discharge: HOME | End: 2024-08-13
Payer: COMMERCIAL

## 2024-08-13 DIAGNOSIS — R00.2 PALPITATIONS: ICD-10-CM

## 2024-08-13 PROCEDURE — 93246 EXT ECG>7D<15D RECORDING: CPT

## 2024-12-10 ENCOUNTER — OFFICE VISIT (OUTPATIENT)
Dept: PRIMARY CARE | Facility: CLINIC | Age: 39
End: 2024-12-10
Payer: COMMERCIAL

## 2024-12-10 VITALS
WEIGHT: 225 LBS | DIASTOLIC BLOOD PRESSURE: 70 MMHG | HEIGHT: 70 IN | BODY MASS INDEX: 32.21 KG/M2 | OXYGEN SATURATION: 97 % | HEART RATE: 80 BPM | SYSTOLIC BLOOD PRESSURE: 114 MMHG

## 2024-12-10 DIAGNOSIS — Z23 INFLUENZA VACCINE NEEDED: ICD-10-CM

## 2024-12-10 DIAGNOSIS — H66.005 RECURRENT ACUTE SUPPURATIVE OTITIS MEDIA WITHOUT SPONTANEOUS RUPTURE OF LEFT TYMPANIC MEMBRANE: Primary | ICD-10-CM

## 2024-12-10 PROCEDURE — 99213 OFFICE O/P EST LOW 20 MIN: CPT

## 2024-12-10 PROCEDURE — 90656 IIV3 VACC NO PRSV 0.5 ML IM: CPT

## 2024-12-10 PROCEDURE — 1036F TOBACCO NON-USER: CPT

## 2024-12-10 PROCEDURE — 90471 IMMUNIZATION ADMIN: CPT

## 2024-12-10 PROCEDURE — 3008F BODY MASS INDEX DOCD: CPT

## 2024-12-10 RX ORDER — AMOXICILLIN AND CLAVULANATE POTASSIUM 875; 125 MG/1; MG/1
875 TABLET, FILM COATED ORAL 2 TIMES DAILY
Qty: 20 TABLET | Refills: 0 | Status: SHIPPED | OUTPATIENT
Start: 2024-12-10 | End: 2024-12-20

## 2024-12-10 RX ORDER — AMOXICILLIN 875 MG/1
1 TABLET, FILM COATED ORAL
COMMUNITY
Start: 2024-12-02

## 2024-12-10 NOTE — PROGRESS NOTES
"Primary Care Provider: ESTHER Farris-CNP    Subjective   Eda PINEDA Radha Sprague is a 39 y.o. female who presents for Follow-up.    \[facility]\""     Urgent care follow up  12/2/24  Tested positive for RSV  Dx with acute otitis media, left and started on:  --START amoxicillin. Take 1 tablet by mouth two times a day for 7 days.  --INCREASE flonase to twice daily.  --Alternate tylenol 1000mg and ibuprofen 400mg every 4-6 hours for pain.   --Continue zyrtec.     Today still having difficulty hearing out of her left ear, tinnitus  On the car ride here now has partial hearing  The rest of her symptoms have cleared  She continues to take  flonase to twice daily & zyrtec  Otherwise she feels like the remainder of her symptoms have cleared    Review of Systems  The remainder of the ROS was negative unless otherwise stated in the HPI.       Objective   /70   Pulse 80   Ht 1.778 m (5' 10\")   Wt 102 kg (225 lb)   SpO2 97%   BMI 32.28 kg/m²     Physical Exam  Vitals reviewed.   Constitutional:       General: She is not in acute distress.     Appearance: Normal appearance. She is normal weight. She is not ill-appearing, toxic-appearing or diaphoretic.   HENT:      Head: Normocephalic and atraumatic.      Right Ear: Tympanic membrane, ear canal and external ear normal. There is no impacted cerumen.      Left Ear: Drainage and swelling present. A middle ear effusion is present. Tympanic membrane is erythematous and bulging.      Nose: Nose normal.   Eyes:      Conjunctiva/sclera: Conjunctivae normal.   Cardiovascular:      Rate and Rhythm: Normal rate and regular rhythm.      Pulses: Normal pulses.      Heart sounds: Normal heart sounds. No murmur heard.     No friction rub. No gallop.   Pulmonary:      Effort: Pulmonary effort is normal. No respiratory distress.      Breath sounds: Normal breath sounds.   Abdominal:      General: Abdomen is flat. Bowel sounds are normal.      Palpations: Abdomen is soft. "   Musculoskeletal:         General: Normal range of motion.      Cervical back: Normal range of motion and neck supple.   Lymphadenopathy:      Cervical: No cervical adenopathy.   Skin:     General: Skin is warm and dry.      Capillary Refill: Capillary refill takes less than 2 seconds.   Neurological:      General: No focal deficit present.      Mental Status: She is alert and oriented to person, place, and time. Mental status is at baseline.   Psychiatric:         Mood and Affect: Mood normal.         Behavior: Behavior normal.         Thought Content: Thought content normal.         Judgment: Judgment normal.         Assessment/Plan   Problem List Items Addressed This Visit    None  Visit Diagnoses         Codes    Recurrent acute suppurative otitis media without spontaneous rupture of left tympanic membrane    -  Primary H66.005    Relevant Medications    amoxicillin-pot clavulanate (Augmentin) 875-125 mg tablet    Influenza vaccine needed     Z23    Relevant Orders    Flu vaccine, trivalent, preservative free, age 6 months and greater (Fluarix/Fluzone/Flulaval)        --INCREASE flonase to twice daily.  --Alternate tylenol 1000mg and ibuprofen 400mg every 4-6 hours for pain.   --Continue zyrtec.   --probiotic or yogurt with abx      Follow up if worsening or persisting symptoms

## 2025-02-25 ENCOUNTER — OFFICE VISIT (OUTPATIENT)
Dept: PRIMARY CARE | Facility: CLINIC | Age: 40
End: 2025-02-25
Payer: COMMERCIAL

## 2025-02-25 VITALS
HEIGHT: 70 IN | BODY MASS INDEX: 32.64 KG/M2 | WEIGHT: 228 LBS | OXYGEN SATURATION: 98 % | SYSTOLIC BLOOD PRESSURE: 144 MMHG | HEART RATE: 71 BPM | DIASTOLIC BLOOD PRESSURE: 90 MMHG

## 2025-02-25 DIAGNOSIS — L23.9 ALLERGIC CONTACT DERMATITIS, UNSPECIFIED TRIGGER: Primary | ICD-10-CM

## 2025-02-25 PROCEDURE — 1036F TOBACCO NON-USER: CPT

## 2025-02-25 PROCEDURE — 3008F BODY MASS INDEX DOCD: CPT

## 2025-02-25 PROCEDURE — 99213 OFFICE O/P EST LOW 20 MIN: CPT

## 2025-02-25 RX ORDER — METHYLPREDNISOLONE 4 MG/1
TABLET ORAL
Qty: 21 TABLET | Refills: 0 | Status: SHIPPED | OUTPATIENT
Start: 2025-02-25 | End: 2025-03-03

## 2025-02-25 RX ORDER — CLOBETASOL PROPIONATE 0.5 MG/G
CREAM TOPICAL 2 TIMES DAILY
Qty: 30 G | Refills: 0 | Status: SHIPPED | OUTPATIENT
Start: 2025-02-25 | End: 2025-03-11

## 2025-02-25 RX ORDER — HYDROXYZINE HYDROCHLORIDE 25 MG/1
25 TABLET, FILM COATED ORAL 2 TIMES DAILY
Qty: 60 TABLET | Refills: 0 | Status: SHIPPED | OUTPATIENT
Start: 2025-02-25 | End: 2025-03-27

## 2025-02-25 ASSESSMENT — ENCOUNTER SYMPTOMS
FEVER: 0
SORE THROAT: 0
SHORTNESS OF BREATH: 0
EYE PAIN: 0
NAIL CHANGES: 0
ANOREXIA: 0
FATIGUE: 0
COUGH: 0
VOMITING: 0
RHINORRHEA: 0
DIARRHEA: 0

## 2025-02-25 NOTE — PROGRESS NOTES
Primary Care Provider: Moriah Alonos, ESTHER-CNP    Subjective   Eda PINEDA Radha Sprague is a 39 y.o. female who presents for Follow-up (/I have a rash that has been on and off for over a month and is getting worse. It is on my neck and my eyes. //Clear and go away /Burning and itching ).    Rash started 2 months ago  Itches   Located on: chest, neck right arm, BL eye ,lids  Aveeno Eczema lotion and Aloe and cortisone cream and ice on it- does help some  Went to [place] in February and rash improved and then return about 2 weeks ago  Went to the [facility] and got back yesterday        Rash  This is a new problem. The current episode started more than 1 month ago. The problem has been waxing and waning since onset. The affected locations include the face, neck and chest. The rash is characterized by burning, dryness, redness, swelling and itchiness. It is nothing and unknown if there was an exposure to a precipitant. She was exposed to nothing and unknown. Pertinent negatives include no anorexia, congestion, cough, diarrhea, eye pain, facial edema, fatigue, fever, joint pain, nail changes, rhinorrhea, shortness of breath, sore throat or vomiting.      Answers submitted by the patient for this visit:  Rash Questionnaire (Submitted on 2/25/2025)  Chief Complaint: Rash  Chronicity: new  Onset: more than 1 month ago  Progression since onset: waxing and waning  Affected locations: face, neck, chest  Characteristics: burning, dryness, redness, swelling, itchiness  Exposed to: nothing, unknown  anorexia: No  congestion: No  cough: No  diarrhea: No  eye pain: No  facial edema: No  fatigue: No  fever: No  joint pain: No  nail changes: No  rhinorrhea: No  shortness of breath: No  sore throat: No  vomiting: No    Review of Systems   Constitutional:  Negative for fatigue and fever.   HENT:  Negative for congestion, rhinorrhea and sore throat.    Eyes:  Negative for pain.   Respiratory:  Negative for cough and shortness of  "breath.    Gastrointestinal:  Negative for anorexia, diarrhea and vomiting.   Musculoskeletal:  Negative for joint pain.   Skin:  Positive for rash. Negative for nail changes.     The remainder of the ROS was negative unless otherwise stated in the HPI.       Objective   /90   Pulse 71   Ht 1.778 m (5' 10\")   Wt 103 kg (228 lb)   SpO2 98%   BMI 32.71 kg/m²     Physical Exam  Vitals reviewed.   Constitutional:       General: She is not in acute distress.     Appearance: Normal appearance. She is normal weight. She is not ill-appearing, toxic-appearing or diaphoretic.   HENT:      Head: Normocephalic and atraumatic.      Nose: Nose normal.   Eyes:      Conjunctiva/sclera: Conjunctivae normal.   Cardiovascular:      Rate and Rhythm: Normal rate and regular rhythm.      Pulses: Normal pulses.      Heart sounds: Normal heart sounds. No murmur heard.     No friction rub. No gallop.   Pulmonary:      Effort: Pulmonary effort is normal. No respiratory distress.      Breath sounds: Normal breath sounds.   Abdominal:      General: Abdomen is flat. Bowel sounds are normal.      Palpations: Abdomen is soft.   Musculoskeletal:         General: Normal range of motion.   Skin:     General: Skin is warm and dry.      Findings: Rash present.      Comments: Rash is confluent on her chest and neck with sharp, defined borders and some scaling.    Neurological:      General: No focal deficit present.      Mental Status: She is alert and oriented to person, place, and time. Mental status is at baseline.   Psychiatric:         Mood and Affect: Mood normal.         Behavior: Behavior normal.         Thought Content: Thought content normal.         Judgment: Judgment normal.         Assessment/Plan   Problem List Items Addressed This Visit             ICD-10-CM    Allergic contact dermatitis - Primary L23.9    Relevant Medications    methylPREDNISolone (Medrol Dospak) 4 mg tablets    hydrOXYzine HCL (Atarax) 25 mg tablet    " clobetasol (Temovate) 0.05 % cream     Follow up if worsening or persisting symptoms

## 2025-02-26 ENCOUNTER — HOSPITAL ENCOUNTER (OUTPATIENT)
Dept: CARDIOLOGY | Facility: HOSPITAL | Age: 40
Discharge: HOME | End: 2025-02-26
Payer: COMMERCIAL

## 2025-02-26 ENCOUNTER — APPOINTMENT (OUTPATIENT)
Dept: RADIOLOGY | Facility: HOSPITAL | Age: 40
End: 2025-02-26
Payer: COMMERCIAL

## 2025-02-26 ENCOUNTER — HOSPITAL ENCOUNTER (EMERGENCY)
Facility: HOSPITAL | Age: 40
Discharge: HOME | End: 2025-02-26
Attending: EMERGENCY MEDICINE
Payer: COMMERCIAL

## 2025-02-26 ENCOUNTER — PATIENT MESSAGE (OUTPATIENT)
Dept: PRIMARY CARE | Facility: CLINIC | Age: 40
End: 2025-02-26
Payer: COMMERCIAL

## 2025-02-26 VITALS
DIASTOLIC BLOOD PRESSURE: 109 MMHG | WEIGHT: 220 LBS | RESPIRATION RATE: 18 BRPM | SYSTOLIC BLOOD PRESSURE: 156 MMHG | HEART RATE: 86 BPM | BODY MASS INDEX: 31.5 KG/M2 | HEIGHT: 70 IN | TEMPERATURE: 97.9 F | OXYGEN SATURATION: 99 %

## 2025-02-26 DIAGNOSIS — E78.00 ELEVATED LDL CHOLESTEROL LEVEL: Primary | ICD-10-CM

## 2025-02-26 DIAGNOSIS — R51.9 ACUTE NONINTRACTABLE HEADACHE, UNSPECIFIED HEADACHE TYPE: Primary | ICD-10-CM

## 2025-02-26 DIAGNOSIS — R21 RASH: ICD-10-CM

## 2025-02-26 DIAGNOSIS — R07.9 CHEST PAIN, UNSPECIFIED TYPE: ICD-10-CM

## 2025-02-26 DIAGNOSIS — R03.0 ELEVATED BLOOD PRESSURE READING: ICD-10-CM

## 2025-02-26 DIAGNOSIS — R03.0 ELEVATED BLOOD PRESSURE READING WITHOUT DIAGNOSIS OF HYPERTENSION: ICD-10-CM

## 2025-02-26 LAB
ALBUMIN SERPL BCP-MCNC: 4.5 G/DL (ref 3.4–5)
ALP SERPL-CCNC: 43 U/L (ref 33–110)
ALT SERPL W P-5'-P-CCNC: 21 U/L (ref 7–45)
ANION GAP SERPL CALC-SCNC: 15 MMOL/L (ref 10–20)
APPEARANCE UR: CLEAR
AST SERPL W P-5'-P-CCNC: 18 U/L (ref 9–39)
BASOPHILS # BLD AUTO: 0.01 X10*3/UL (ref 0–0.1)
BASOPHILS NFR BLD AUTO: 0.2 %
BILIRUB SERPL-MCNC: 0.3 MG/DL (ref 0–1.2)
BILIRUB UR STRIP.AUTO-MCNC: NEGATIVE MG/DL
BUN SERPL-MCNC: 7 MG/DL (ref 6–23)
CALCIUM SERPL-MCNC: 9.6 MG/DL (ref 8.6–10.3)
CARDIAC TROPONIN I PNL SERPL HS: 3 NG/L (ref 0–13)
CARDIAC TROPONIN I PNL SERPL HS: 3 NG/L (ref 0–13)
CHLORIDE SERPL-SCNC: 99 MMOL/L (ref 98–107)
CO2 SERPL-SCNC: 23 MMOL/L (ref 21–32)
COLOR UR: NORMAL
CREAT SERPL-MCNC: 0.79 MG/DL (ref 0.5–1.05)
EGFRCR SERPLBLD CKD-EPI 2021: >90 ML/MIN/1.73M*2
EOSINOPHIL # BLD AUTO: 0 X10*3/UL (ref 0–0.7)
EOSINOPHIL NFR BLD AUTO: 0 %
ERYTHROCYTE [DISTWIDTH] IN BLOOD BY AUTOMATED COUNT: 12.3 % (ref 11.5–14.5)
GLUCOSE SERPL-MCNC: 100 MG/DL (ref 74–99)
GLUCOSE UR STRIP.AUTO-MCNC: NORMAL MG/DL
HCG UR QL IA.RAPID: NEGATIVE
HCT VFR BLD AUTO: 44.4 % (ref 36–46)
HGB BLD-MCNC: 14.6 G/DL (ref 12–16)
IMM GRANULOCYTES # BLD AUTO: 0.02 X10*3/UL (ref 0–0.7)
IMM GRANULOCYTES NFR BLD AUTO: 0.4 % (ref 0–0.9)
KETONES UR STRIP.AUTO-MCNC: NEGATIVE MG/DL
LEUKOCYTE ESTERASE UR QL STRIP.AUTO: NEGATIVE
LYMPHOCYTES # BLD AUTO: 0.73 X10*3/UL (ref 1.2–4.8)
LYMPHOCYTES NFR BLD AUTO: 13.4 %
MCH RBC QN AUTO: 30.2 PG (ref 26–34)
MCHC RBC AUTO-ENTMCNC: 32.9 G/DL (ref 32–36)
MCV RBC AUTO: 92 FL (ref 80–100)
MONOCYTES # BLD AUTO: 0.32 X10*3/UL (ref 0.1–1)
MONOCYTES NFR BLD AUTO: 5.9 %
NEUTROPHILS # BLD AUTO: 4.37 X10*3/UL (ref 1.2–7.7)
NEUTROPHILS NFR BLD AUTO: 80.1 %
NITRITE UR QL STRIP.AUTO: NEGATIVE
NRBC BLD-RTO: 0 /100 WBCS (ref 0–0)
PH UR STRIP.AUTO: 6.5 [PH]
PLATELET # BLD AUTO: 309 X10*3/UL (ref 150–450)
POTASSIUM SERPL-SCNC: 3.7 MMOL/L (ref 3.5–5.3)
PROT SERPL-MCNC: 7.9 G/DL (ref 6.4–8.2)
PROT UR STRIP.AUTO-MCNC: NEGATIVE MG/DL
RBC # BLD AUTO: 4.84 X10*6/UL (ref 4–5.2)
RBC # UR STRIP.AUTO: NEGATIVE MG/DL
SODIUM SERPL-SCNC: 133 MMOL/L (ref 136–145)
SP GR UR STRIP.AUTO: 1.01
UROBILINOGEN UR STRIP.AUTO-MCNC: NORMAL MG/DL
WBC # BLD AUTO: 5.5 X10*3/UL (ref 4.4–11.3)

## 2025-02-26 PROCEDURE — 93005 ELECTROCARDIOGRAM TRACING: CPT

## 2025-02-26 PROCEDURE — 85025 COMPLETE CBC W/AUTO DIFF WBC: CPT | Performed by: PHYSICIAN ASSISTANT

## 2025-02-26 PROCEDURE — 70450 CT HEAD/BRAIN W/O DYE: CPT

## 2025-02-26 PROCEDURE — 84484 ASSAY OF TROPONIN QUANT: CPT | Performed by: PHYSICIAN ASSISTANT

## 2025-02-26 PROCEDURE — 99285 EMERGENCY DEPT VISIT HI MDM: CPT | Mod: 25 | Performed by: EMERGENCY MEDICINE

## 2025-02-26 PROCEDURE — 80053 COMPREHEN METABOLIC PANEL: CPT | Performed by: PHYSICIAN ASSISTANT

## 2025-02-26 PROCEDURE — 81025 URINE PREGNANCY TEST: CPT | Performed by: PHYSICIAN ASSISTANT

## 2025-02-26 PROCEDURE — 81003 URINALYSIS AUTO W/O SCOPE: CPT | Performed by: PHYSICIAN ASSISTANT

## 2025-02-26 PROCEDURE — 71046 X-RAY EXAM CHEST 2 VIEWS: CPT

## 2025-02-26 PROCEDURE — 36415 COLL VENOUS BLD VENIPUNCTURE: CPT | Performed by: PHYSICIAN ASSISTANT

## 2025-02-26 PROCEDURE — 71046 X-RAY EXAM CHEST 2 VIEWS: CPT | Performed by: STUDENT IN AN ORGANIZED HEALTH CARE EDUCATION/TRAINING PROGRAM

## 2025-02-26 PROCEDURE — 70450 CT HEAD/BRAIN W/O DYE: CPT | Performed by: RADIOLOGY

## 2025-02-26 RX ORDER — ACETAMINOPHEN 325 MG/1
975 TABLET ORAL ONCE
Status: DISCONTINUED | OUTPATIENT
Start: 2025-02-26 | End: 2025-02-26 | Stop reason: HOSPADM

## 2025-02-26 ASSESSMENT — HEART SCORE
TROPONIN: LESS THAN OR EQUAL TO NORMAL LIMIT
HISTORY: SLIGHTLY SUSPICIOUS
HEART SCORE: 1
ECG: NORMAL
AGE: <45
RISK FACTORS: 1-2 RISK FACTORS

## 2025-02-26 ASSESSMENT — PAIN SCALES - GENERAL: PAINLEVEL_OUTOF10: 5 - MODERATE PAIN

## 2025-02-26 ASSESSMENT — PAIN - FUNCTIONAL ASSESSMENT: PAIN_FUNCTIONAL_ASSESSMENT: 0-10

## 2025-02-26 NOTE — ED TRIAGE NOTES
TRIAGE NOTE   I saw the patient as the Clinician in Triage and performed a brief history and physical exam, established acuity, and ordered appropriate tests to develop basic plan of care. Patient will be seen by an [facility], resident and/or physician who will independently evaluate the patient. Please see subsequent provider notes for further details and disposition.     Brief HPI: In brief, Eda Sprague is a 39 y.o. female that presents for elevated blood pressure headache chest pain shortness of breath.  Patient recent had a nonspecific rash for which she saw her PCP.  Treated for nonspecific allergic reaction with Medrol Dosepak and antihistamines.  Prior to starting those meds her blood pressure was high in the office.  No prior history of hypertension.  Her blood pressures been more elevated and she stopped taking the Medrol Dosepak yesterday.  The rash is improving.  She has had intermittent frontal headaches waxing and waning currently mild.  No focal neurological symptoms-no vision change hemiparesis balance or gait disturbance.  No prior history of headache syndromes.  No recent febrile illnesses.  Chest discomfort comes and goes occasionally radiate left arm shoulder.  Mild shortness of breath no nausea vomiting or diaphoresis.  Patient has had a mild cough.  She is currently on her menses.  Denies pregnancy.  She is on birth control pills..     Focused Physical exam:   Blood pressure 154/104.  Afebrile no tachycardia or tachypnea.  Pulse ox 90%.  She is awake alert coherent normal mental status.  Ambulating upright posture steady gait no ataxia.  No focal neurological signs or symptoms.  No upper or lower extremity drift.  No facial asymmetry.  No vision change or loss.  Speech is clear and fluent.  No active cardiorespiratory symptoms.  No extra difficulty.  No increased work of breathing.    Plan/MDM:   Workup initiated.  EKG no STEMI.  Patient stable pending bed availability and further  evaluation.  Please see subsequent provider note for further details and disposition

## 2025-02-26 NOTE — Clinical Note
Eda Sprague was seen and treated in our emergency department on 2/26/2025.  She may return to work on 02/27/2025.       If you have any questions or concerns, please don't hesitate to call.      Silvio Dawson MD

## 2025-02-27 NOTE — ED PROVIDER NOTES
Chief Complaint   Patient presents with    Hypertension    Headache     History of Present Illness   Eda Sprague   MRN [id number]    39-year-old presents for evaluation of multiple symptoms.  She reports rash of the chest, neck, right arm on and off for more than a month.  Started Medrol Dosepak yesterday and rash has significantly improved.  Evaluated by primary care physician yesterday and blood pressure was 144/90.  Today blood pressure was elevated with diastolic as high as 120 which prompted concern.  Patient reports additional symptoms on and off including generalized headache, chest pain, shortness of breath with exertion, body aches, joint pains, temperature of 100.1 yesterday.  No vision changes, neck pain, abdominal pain, nausea or vomiting, urinary frequency or dysuria, extremity weakness or numbness, word-finding difficulty or slurred speech, difficulty ambulating or falls.  Patient reports no chronic medical conditions or previous surgeries.  Reports that blood pressure is usually well-controlled.    Reviewed records from 8/2/2024 cardiology visit.  Chronic medical conditions documented including hyperlipidemia.    Physical Exam   T 36.6 °C (97.9 °F)  HR 88  BP (!) 154/104  RR 16  O2 99 % None (Room air)    General: Well-appearing.  Head: Atraumatic.  Eyes: No conjunctival injection.   Ears Nose Throat: No epistaxis. Oral mucosa moist.  No oropharyngeal erythema or exudate.  Neck: Supple.  Cardiovascular: Extremities warm.  Regular rhythm.  No murmur.  No JVD.  Pulmonary: No stridor. Normal respiratory effort.  Lungs clear.  No conversational dyspnea on room air.  Abdominal: No distention.  Musculoskeletal: No deformity or joint swelling.  Skin: Scaling rash right neck.  Psychiatric: Does not appear to respond to internal stimuli.  Neurologic: Alert. Follows directions. Extraocular movements intact. Visual fields intact by finger counting. No nystagmus. Facial sensation to light touch  intact. Eyebrows and corners of the mouth elevate symmetrically. Tongue protrudes midline. No aphasia or dysarthria. Strength 5/5 upper and lower extremities. Extremity sensation to light touch intact. No finger nose dysmetria. Intact rapid alternating hand movements. Ambulates with steady gait.  Tandem gait without difficulty.    ED Course & Medical Decision Making   Triage vital signs notable for blood pressure 154/104 vital signs otherwise within normal limits.  Patient initially seen by provider in triage who ordered labs, CT head, chest x-ray, EKG.  EKG sinus rhythm without acute ischemic findings and both initial and repeat high-sensitivity troponin normal.  Do not suspect acute coronary syndrome or myocarditis.  Blood count demonstrated no leukocytosis anemia or thrombocytopenia.  Low absolute lymphocyte count which in combination with symptoms may indicate viral illness.  Comprehensive metabolic panel demonstrated glucose 100 and sodium 133 otherwise within normal limits.  Urinalysis without infection and pregnancy test negative.  CT head demonstrated no intracranial hemorrhage or other acute intracranial abnormalities.  Chest x-ray without pneumothorax infiltrate or other acute cardiopulmonary findings.  History obtained from patient and wife at bedside.  I reviewed overall reassuring results with patient.  Blood pressure on recheck remained elevated 156/109.  Discussed that symptoms may be explained by viral syndrome however given report of chest pain, shortness of breath exertion, oral contraceptive, recommended either VTE exclusion D-dimer or CT angio chest to exclude pulmonary emboli.  Patient verbalized understanding and preferred to defer further testing in favor of returning home with plan for supportive care and return if new or worsening symptoms.  We discussed elevated blood pressure that may be due to recent steroid use, viral syndrome, whitecoat hypertension, or alternative explanations.   Advised follow-up with primary care physician to recheck blood pressure and discuss management options if remains elevated.    ED Course as of 02/26/25 2149 Wed Feb 26, 2025 2028 ECG 12 lead (Clinic Performed)  Interpreted by me.  2/26/2025 at 1354.  Sinus rhythm sinus arrhythmia 85 bpm.  , QRS 80, QTc 404.  No ST elevation. []   2147 HEART Score: 1  []      ED Course User Index  [MC] Silvio Dawson MD         Diagnoses as of 02/26/25 2149   Acute nonintractable headache, unspecified headache type   Elevated blood pressure reading   Chest pain, unspecified type   Rash            Silvio Dawson MD  02/26/25 2149

## 2025-02-27 NOTE — ED NOTES
Discharge instructions reviewed with patient at this time. Pt instructed to follow up with PCP.      Gabrielle Spatz, RN  02/26/25 1042

## 2025-02-27 NOTE — DISCHARGE INSTRUCTIONS
Please follow-up with your primary care physician to discuss options for management if blood pressure remains elevated.  May take Tylenol or Motrin for body aches.  Return to emergency department for reassessment if new or worsening symptoms.

## 2025-02-28 LAB
ATRIAL RATE: 96 BPM
P AXIS: 66 DEGREES
P OFFSET: 198 MS
P ONSET: 149 MS
PR INTERVAL: 142 MS
Q ONSET: 220 MS
QRS COUNT: 14 BEATS
QRS DURATION: 80 MS
QT INTERVAL: 340 MS
QTC CALCULATION(BAZETT): 404 MS
QTC FREDERICIA: 381 MS
R AXIS: 55 DEGREES
T AXIS: 53 DEGREES
T OFFSET: 390 MS
VENTRICULAR RATE: 85 BPM

## 2025-03-03 ENCOUNTER — OFFICE VISIT (OUTPATIENT)
Dept: PRIMARY CARE | Facility: CLINIC | Age: 40
End: 2025-03-03
Payer: COMMERCIAL

## 2025-03-03 VITALS
SYSTOLIC BLOOD PRESSURE: 110 MMHG | WEIGHT: 226 LBS | BODY MASS INDEX: 32.35 KG/M2 | DIASTOLIC BLOOD PRESSURE: 82 MMHG | HEIGHT: 70 IN | OXYGEN SATURATION: 97 %

## 2025-03-03 DIAGNOSIS — T78.40XS ALLERGY, SEQUELA: ICD-10-CM

## 2025-03-03 DIAGNOSIS — R00.2 PALPITATIONS: ICD-10-CM

## 2025-03-03 DIAGNOSIS — R00.0 RACING HEART BEAT: Primary | ICD-10-CM

## 2025-03-03 DIAGNOSIS — R03.0 ELEVATED BLOOD PRESSURE, SITUATIONAL: ICD-10-CM

## 2025-03-03 DIAGNOSIS — R94.31 ABNORMAL EKG: ICD-10-CM

## 2025-03-03 DIAGNOSIS — E78.00 ELEVATED LDL CHOLESTEROL LEVEL: ICD-10-CM

## 2025-03-03 PROCEDURE — 99214 OFFICE O/P EST MOD 30 MIN: CPT

## 2025-03-03 PROCEDURE — 3079F DIAST BP 80-89 MM HG: CPT

## 2025-03-03 PROCEDURE — 3008F BODY MASS INDEX DOCD: CPT

## 2025-03-03 PROCEDURE — 1036F TOBACCO NON-USER: CPT

## 2025-03-03 PROCEDURE — 3074F SYST BP LT 130 MM HG: CPT

## 2025-03-03 ASSESSMENT — ENCOUNTER SYMPTOMS
PALPITATIONS: 1
SHORTNESS OF BREATH: 1
HYPERTENSION: 1
HEADACHES: 1

## 2025-03-03 NOTE — PROGRESS NOTES
Primary Care Provider: ESTHER Farris-CNP    Subjective   Eda PINEDA Radha Sprague is a 39 y.o. female who presents for Follow-up (ER follow up. BP/List of things to go over ).    Hypertension  This is a new problem. The current episode started in the past 7 days. The problem has been waxing and waning since onset. The problem is resistant. Associated symptoms include headaches, palpitations and shortness of breath. Agents associated with hypertension include oral contraceptives. There are no known risk factors for coronary artery disease. There are no compliance problems.         ER Follow up  Went 2/26/25    HTN    Rash has greatly improved  Started Medrol Dosepak - rash has significantly improved but then stopped  Never started the hydroxyzine  BP was  's /  at home when she went to the ER also Had a severe HA and felt like she had a racing heart rate  CT head was negative  CXR was negative  Blood pressure last office visit was 144/90  She had cut out caffeine when she had her HA  Taking blood pressure over 10 times a day; has list today  Still having racing HR and palpitations  ER EKG sinus rhythm with sinus arrhythmia and enlarged atria    Not feeling achy today   No chest pain since the ER  Yesterday felt out of breath but not today  Normal temperature   No vision changes, no neck pain, no extremity weakness   Some numbness in BL hands at time of ER visit- nothing today     Answers submitted by the patient for this visit:  High Blood Pressure Questionnaire (Submitted on 3/3/2025)  Chief Complaint: Hypertension  Chronicity: new  Onset: in the past 7 days  Progression since onset: waxing and waning  Condition status: resistant  headaches: Yes  palpitations: Yes  shortness of breath: Yes  Agents associated with hypertension: oral contraceptives  CAD risks: no known risk factors  Compliance problems: no compliance problems    Review of Systems   Respiratory:  Positive for shortness of breath.   "  Cardiovascular:  Positive for palpitations.   Neurological:  Positive for headaches.     The remainder of the ROS was negative unless otherwise stated in the HPI.       Objective   /82   Ht 1.778 m (5' 10\")   Wt 103 kg (226 lb)   SpO2 97%   BMI 32.43 kg/m²     Physical Exam  Vitals reviewed.   Constitutional:       General: She is not in acute distress.     Appearance: Normal appearance. She is normal weight. She is not ill-appearing, toxic-appearing or diaphoretic.   HENT:      Head: Normocephalic and atraumatic.      Nose: Nose normal.   Eyes:      Conjunctiva/sclera: Conjunctivae normal.   Cardiovascular:      Rate and Rhythm: Normal rate and regular rhythm.      Pulses: Normal pulses.      Heart sounds: Normal heart sounds. No murmur heard.     No friction rub. No gallop.   Pulmonary:      Effort: Pulmonary effort is normal. No respiratory distress.      Breath sounds: Normal breath sounds.   Abdominal:      General: Abdomen is flat. Bowel sounds are normal.      Palpations: Abdomen is soft.   Skin:     General: Skin is warm and dry.   Neurological:      General: No focal deficit present.      Mental Status: She is alert and oriented to person, place, and time. Mental status is at baseline.   Psychiatric:         Mood and Affect: Mood normal.         Behavior: Behavior normal.         Thought Content: Thought content normal.         Judgment: Judgment normal.         Assessment/Plan   Problem List Items Addressed This Visit             ICD-10-CM    Elevated LDL cholesterol level E78.00    Relevant Orders    Transthoracic Echo (TTE) Complete     Other Visit Diagnoses         Codes    Racing heart beat    -  Primary R00.0    Relevant Orders    Transthoracic Echo (TTE) Complete    C-reactive protein    Sedimentation Rate    RHONDA with Reflex to MARILEE    Referral to Cardiac Electrophysiology    Allergy, sequela     T78.40XS    Relevant Orders    Respiratory Allergy Profile IgE    Palpitations     R00.2    " Relevant Orders    Transthoracic Echo (TTE) Complete    C-reactive protein    Sedimentation Rate    RHONDA with Reflex to MARILEE    Referral to Cardiac Electrophysiology    Elevated blood pressure, situational     R03.0    Relevant Orders    Transthoracic Echo (TTE) Complete    C-reactive protein    Sedimentation Rate    RHONDA with Reflex to MARILEE    Referral to Cardiac Electrophysiology    Abnormal EKG     R94.31    Relevant Orders    Referral to Cardiac Electrophysiology          Check BP once daily ; bring BP cuff to neck visit        Follow up in 3 months or sooner if needed

## 2025-03-04 DIAGNOSIS — E78.00 ELEVATED LDL CHOLESTEROL LEVEL: Primary | ICD-10-CM

## 2025-03-04 LAB
CHOLEST SERPL-MCNC: 234 MG/DL
CHOLEST/HDLC SERPL: 3.3 (CALC)
HDLC SERPL-MCNC: 70 MG/DL
LDLC SERPL CALC-MCNC: 141 MG/DL (CALC)
NONHDLC SERPL-MCNC: 164 MG/DL (CALC)
TRIGL SERPL-MCNC: 115 MG/DL
TSH SERPL-ACNC: 1.37 MIU/L

## 2025-03-06 LAB
A ALTERNATA IGE QN: 0.4 KU/L
A ALTERNATA IGE RAST: 1
A FUMIGATUS IGE QN: <0.1 KU/L
A FUMIGATUS IGE RAST: 0
ANA PAT SER IF-IMP: ABNORMAL
ANA SER QL IF: POSITIVE
ANA TITR SER IF: ABNORMAL TITER
BERMUDA GRASS IGE QN: <0.1 KU/L
BERMUDA GRASS IGE RAST: 0
BOXELDER IGE QN: <0.1 KU/L
BOXELDER IGE RAST: 0
C HERBARUM IGE QN: <0.1 KU/L
C HERBARUM IGE RAST: 0
CALIF WALNUT POLN IGE QN: <0.1 KU/L
CALIF WALNUT POLN IGE RAST: 0
CAT DANDER IGE QN: <0.1 KU/L
CAT DANDER IGE RAST: 0
CENTROMERE B AB SER-ACNC: ABNORMAL AI
CMN PIGWEED IGE QN: <0.1 KU/L
CMN PIGWEED IGE RAST: 0
COMMON RAGWEED IGE QN: <0.1 KU/L
COMMON RAGWEED IGE RAST: 0
COTTONWOOD IGE QN: <0.1 KU/L
COTTONWOOD IGE RAST: 0
CRP SERPL-MCNC: 5.5 MG/L
D FARINAE IGE QN: 0.7 KU/L
D FARINAE IGE RAST: 2
D PTERONYSS IGE QN: 0.43 KU/L
D PTERONYSS IGE RAST: 1
DOG DANDER IGE QN: <0.1 KU/L
DOG DANDER IGE RAST: 0
DSDNA AB SER-ACNC: 1 IU/ML
ENA JO1 AB SER IA-ACNC: ABNORMAL AI
ENA RNP AB SER-ACNC: ABNORMAL AI
ENA SCL70 AB SER IA-ACNC: ABNORMAL AI
ENA SM AB SER IA-ACNC: ABNORMAL AI
ENA SM+RNP AB SER IA-ACNC: ABNORMAL AI
ENA SS-A AB SER IA-ACNC: ABNORMAL AI
ENA SS-B AB SER IA-ACNC: ABNORMAL AI
ERYTHROCYTE [SEDIMENTATION RATE] IN BLOOD BY WESTERGREN METHOD: 14 MM/H
IGE SERPL-ACNC: 157 KU/L
LABORATORY COMMENT REPORT: ABNORMAL
LONDON PLANE IGE QN: <0.1 KU/L
LONDON PLANE IGE RAST: 0
MOUSE URINE PROT IGE QN: <0.1 KU/L
MOUSE URINE PROT IGE RAST: 0
MT JUNIPER IGE QN: <0.1 KU/L
MT JUNIPER IGE RAST: 0
NUCLEOSOME AB SER IA-ACNC: ABNORMAL AI
P NOTATUM IGE QN: <0.1 KU/L
P NOTATUM IGE RAST: 0
PECAN/HICK TREE IGE QN: <0.1 KU/L
PECAN/HICK TREE IGE RAST: 0
REF LAB TEST REF RANGE: NORMAL
RIBOSOMAL P AB SER-ACNC: ABNORMAL AI
ROACH IGE QN: 0.1 KU/L
ROACH IGE RAST: ABNORMAL
SALTWORT IGE QN: <0.1 KU/L
SALTWORT IGE RAST: 0
SHEEP SORREL IGE QN: <0.1 KU/L
SHEEP SORREL IGE RAST: 0
SILVER BIRCH IGE QN: <0.1 KU/L
SILVER BIRCH IGE RAST: 0
TIMOTHY IGE QN: <0.1 KU/L
TIMOTHY IGE RAST: 0
WHITE ASH IGE QN: <0.1 KU/L
WHITE ASH IGE RAST: 0
WHITE ELM IGE QN: <0.1 KU/L
WHITE ELM IGE RAST: 0
WHITE MULBERRY IGE QN: <0.1 KU/L
WHITE MULBERRY IGE RAST: 0
WHITE OAK IGE QN: <0.1 KU/L
WHITE OAK IGE RAST: 0

## 2025-03-10 ENCOUNTER — HOSPITAL ENCOUNTER (OUTPATIENT)
Dept: CARDIOLOGY | Facility: CLINIC | Age: 40
Discharge: HOME | End: 2025-03-10
Payer: COMMERCIAL

## 2025-03-10 ENCOUNTER — APPOINTMENT (OUTPATIENT)
Dept: PRIMARY CARE | Facility: CLINIC | Age: 40
End: 2025-03-10
Payer: COMMERCIAL

## 2025-03-10 DIAGNOSIS — R00.2 PALPITATIONS: ICD-10-CM

## 2025-03-10 DIAGNOSIS — R00.2 PALPITATIONS: Primary | ICD-10-CM

## 2025-03-10 DIAGNOSIS — J30.89 NON-SEASONAL ALLERGIC RHINITIS, UNSPECIFIED TRIGGER: Primary | ICD-10-CM

## 2025-03-10 DIAGNOSIS — E78.00 ELEVATED LDL CHOLESTEROL LEVEL: ICD-10-CM

## 2025-03-10 DIAGNOSIS — L23.9 ALLERGIC CONTACT DERMATITIS, UNSPECIFIED TRIGGER: ICD-10-CM

## 2025-03-10 DIAGNOSIS — R03.0 ELEVATED BLOOD PRESSURE, SITUATIONAL: ICD-10-CM

## 2025-03-10 DIAGNOSIS — R00.0 RACING HEART BEAT: ICD-10-CM

## 2025-03-10 LAB
AORTIC VALVE PEAK VELOCITY: 1.26 M/S
AV PEAK GRADIENT: 6 MMHG
AVA (PEAK VEL): 2.54 CM2
EJECTION FRACTION APICAL 4 CHAMBER: 63.7
EJECTION FRACTION: 60 %
LEFT ATRIUM VOLUME AREA LENGTH INDEX BSA: 22.1 ML/M2
LEFT VENTRICLE INTERNAL DIMENSION DIASTOLE: 4.76 CM (ref 3.5–6)
LEFT VENTRICULAR OUTFLOW TRACT DIAMETER: 1.92 CM
MITRAL VALVE E/A RATIO: 0.89
RIGHT VENTRICLE FREE WALL PEAK S': 13.4 CM/S
TRICUSPID ANNULAR PLANE SYSTOLIC EXCURSION: 1.9 CM

## 2025-03-10 PROCEDURE — 93306 TTE W/DOPPLER COMPLETE: CPT | Performed by: INTERNAL MEDICINE

## 2025-03-10 PROCEDURE — 93306 TTE W/DOPPLER COMPLETE: CPT

## 2025-03-10 PROCEDURE — 2500000004 HC RX 250 GENERAL PHARMACY W/ HCPCS (ALT 636 FOR OP/ED): Performed by: STUDENT IN AN ORGANIZED HEALTH CARE EDUCATION/TRAINING PROGRAM

## 2025-03-10 RX ADMIN — PERFLUTREN 2 ML OF DILUTION: 6.52 INJECTION, SUSPENSION INTRAVENOUS at 09:41

## 2025-03-13 ENCOUNTER — OFFICE VISIT (OUTPATIENT)
Dept: CARDIOLOGY | Facility: HOSPITAL | Age: 40
End: 2025-03-13
Payer: COMMERCIAL

## 2025-03-13 VITALS
HEART RATE: 82 BPM | BODY MASS INDEX: 32.01 KG/M2 | OXYGEN SATURATION: 98 % | SYSTOLIC BLOOD PRESSURE: 124 MMHG | DIASTOLIC BLOOD PRESSURE: 87 MMHG | WEIGHT: 223.11 LBS

## 2025-03-13 DIAGNOSIS — R03.0 ELEVATED BLOOD PRESSURE, SITUATIONAL: ICD-10-CM

## 2025-03-13 DIAGNOSIS — R00.2 PALPITATIONS: ICD-10-CM

## 2025-03-13 PROCEDURE — 1036F TOBACCO NON-USER: CPT | Performed by: STUDENT IN AN ORGANIZED HEALTH CARE EDUCATION/TRAINING PROGRAM

## 2025-03-13 PROCEDURE — 3079F DIAST BP 80-89 MM HG: CPT | Performed by: STUDENT IN AN ORGANIZED HEALTH CARE EDUCATION/TRAINING PROGRAM

## 2025-03-13 PROCEDURE — 3074F SYST BP LT 130 MM HG: CPT | Performed by: STUDENT IN AN ORGANIZED HEALTH CARE EDUCATION/TRAINING PROGRAM

## 2025-03-13 PROCEDURE — 99214 OFFICE O/P EST MOD 30 MIN: CPT | Performed by: STUDENT IN AN ORGANIZED HEALTH CARE EDUCATION/TRAINING PROGRAM

## 2025-03-13 NOTE — PROGRESS NOTES
Referred by Dr. Alonso for No chief complaint on file.     History Of Present Illness:    Eda Sprague is a 39 y.o. female with rare episodes of heart beats feeling off, similar to missed beats. She denies chest pain, shortness of breath, lightheadedness, syncope.  She had an event monitor back in August 2024 described below.  Her echo from March 10/2025 was normal.  Her last EKG from February 26 showed sinus rhythm with sinus arrhythmia, normal intervals and a heart rate of 85 bpm.  Recent labs showing normal TSH, H&H, WBC, potassium 3.7, sodium 133 EGFR over 90, normal liver function.      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    Past Surgical History:  She has a past surgical history that includes Breast biopsy (Left, April 2023).      Social History:  She reports that she has never smoked. She has never used smokeless tobacco. She reports current alcohol use of about 15.0 standard drinks of alcohol per week. She reports that she does not use drugs.    Family History:  Family History   Problem Relation Name Age of Onset    Anxiety disorder Mother Jessica     Hyperthyroidism Mother Jessica     Hypothyroidism Mother Jessica     Arthritis Mother Jessica     Atrial fibrillation Mother Jessica     Prostate cancer Father Bradley      Hyperlipidemia Father Bradley     Hypothyroidism Cousin      Other (cardiac disorder) Other grandmother     Hypertension Other grandfather     Cancer Other grandfather         Allergies:  Cefaclor and Cephalexin    Outpatient Medications:  Current Outpatient Medications   Medication Instructions    amoxicillin (Amoxil) 875 mg tablet 1 tablet, Every 12 hours scheduled (0630,1830)    desogestreL-ethinyl estradioL (Apri) 0.15-0.03 mg tablet 1 tablet, oral, Daily    fluticasone (Flonase) 50 mcg/actuation nasal spray 2 sprays, Daily    hydrOXYzine HCL (ATARAX) 25 mg, oral, 2 times daily    scopolamine (Transderm-Scop) 1 mg over 3 days patch 3 day 1 patch, transdermal, Every 72 hours        Last Recorded Vitals:  Vitals:    03/13/25 1604   BP: 124/87   Pulse: 82   SpO2: 98%   Weight: 101 kg (223 lb 1.7 oz)       Physical Exam  Constitutional:       Appearance: Normal appearance.   Cardiovascular:      Rate and Rhythm: Normal rate and regular rhythm.      Heart sounds: No murmur heard.     No friction rub. No gallop.   Pulmonary:      Effort: Pulmonary effort is normal.      Breath sounds: Normal breath sounds.   Abdominal:      Palpations: Abdomen is soft.   Musculoskeletal:      Cervical back: Neck supple.   Neurological:      Mental Status: She is alert.   Psychiatric:         Mood and Affect: Mood normal.         Behavior: Behavior normal.             Last Labs:  CBC -  Lab Results   Component Value Date    WBC 5.5 02/26/2025    HGB 14.6 02/26/2025    HCT 44.4 02/26/2025    MCV 92 02/26/2025     02/26/2025       CMP -  Lab Results   Component Value Date    CALCIUM 9.6 02/26/2025    PROT 7.9 02/26/2025    ALBUMIN 4.5 02/26/2025    AST 18 02/26/2025    ALT 21 02/26/2025    ALKPHOS 43 02/26/2025    BILITOT 0.3 02/26/2025       LIPID PANEL -   Lab Results   Component Value Date    CHOL 234 (H) 03/03/2025    TRIG 115 03/03/2025    HDL 70 03/03/2025    CHHDL 3.3 03/03/2025    LDLF 106 (H) 10/01/2020    VLDL 18  03/15/2024    NHDL 164 (H) 03/03/2025       RENAL FUNCTION PANEL -   Lab Results   Component Value Date    GLUCOSE 100 (H) 02/26/2025     (L) 02/26/2025    K 3.7 02/26/2025    CL 99 02/26/2025    CO2 23 02/26/2025    ANIONGAP 15 02/26/2025    BUN 7 02/26/2025    CREATININE 0.79 02/26/2025    CALCIUM 9.6 02/26/2025    ALBUMIN 4.5 02/26/2025        Lab Results   Component Value Date    HGBA1C 5.1 03/15/2024       Last Cardiology Tests:    Echo:  Transthoracic Echo (TTE) Complete 03/10/2025    PHYSICIAN INTERPRETATION:  Left Ventricle: The left ventricular systolic function is normal, with a Martinez's biplane calculated ejection fraction of 60%. There are no regional left ventricular wall motion abnormalities. The left ventricular cavity size is normal. There is mildly increased septal and normal posterior left ventricular wall thickness. Spectral Doppler shows a normal pattern of left ventricular diastolic filling.  Left Atrium: The left atrial size is normal.  Right Ventricle: The right ventricle is normal in size. There is normal right ventricular global systolic function.  Right Atrium: The right atrium is normal in size.  Aortic Valve: The aortic valve is trileaflet. There is no evidence of aortic valve regurgitation. The peak instantaneous gradient of the aortic valve is 6 mmHg.  Mitral Valve: The mitral valve is normal in structure. There is trace mitral valve regurgitation.  Tricuspid Valve: The tricuspid valve is structurally normal. There is trace tricuspid regurgitation. The right ventricular systolic pressure is unable to be estimated.  Pulmonic Valve: The pulmonic valve is structurally normal. There is trace pulmonic valve regurgitation.  Pericardium: Trivial pericardial effusion.  Aorta: The aortic root is normal.  Systemic Veins: The inferior vena cava appears normal in size.  In comparison to the previous echocardiogram(s): There are no prior studies on this patient for comparison purposes.         CONCLUSIONS:   1. The left ventricular systolic function is normal, with a Martinez's biplane calculated ejection fraction of 60%.   2. There is normal right ventricular global systolic function.       Event monitor    August 13 to August 15, 2024  Sinus rhythm with a heart rate 60- bpm.  No VT's or SVTs.  Isolated PVCs and PACs were rare.    Diagnostic review: I have personally reviewed the result(s)     Assessment/Plan   Diagnoses and all orders for this visit:  Palpitations  -     Referral to Cardiac Electrophysiology  Elevated blood pressure, situational  -     Referral to Cardiac Electrophysiology      Patient with rare episodes of heart beats feeling off, similar to missed beats.  She denies chest pain, shortness of breath, lightheadedness, syncope.  She had an event monitor back in August 2024 described below.  Her echo from March 10/2025 was normal.  Her last EKG from February 26 showed sinus rhythm with sinus arrhythmia, normal intervals and a heart rate of 85 bpm.  Recent labs showing normal TSH, H&H, WBC, potassium 3.7, sodium 133 EGFR over 90, normal liver function.    I explained to the patient that most likely her symptoms are related to occasional PVCs or PACs and I reassured her that, since her event monitor showed a low percentage of PACs/PVCs, she should not be concerned.  I explained to her that if this is bothering her significantly, we could consider starting her on medical therapy.  Patient prefers not to be started on medical therapy at this time.  Will follow her up as needed.    Mahnaz Andersen MD

## 2025-04-02 ENCOUNTER — APPOINTMENT (OUTPATIENT)
Dept: RADIOLOGY | Facility: CLINIC | Age: 40
End: 2025-04-02
Payer: COMMERCIAL

## 2025-04-02 DIAGNOSIS — Z12.31 SCREENING MAMMOGRAM FOR BREAST CANCER: ICD-10-CM

## 2025-07-18 ENCOUNTER — APPOINTMENT (OUTPATIENT)
Dept: ALLERGY | Facility: CLINIC | Age: 40
End: 2025-07-18
Payer: COMMERCIAL

## 2025-07-18 VITALS
SYSTOLIC BLOOD PRESSURE: 130 MMHG | DIASTOLIC BLOOD PRESSURE: 87 MMHG | BODY MASS INDEX: 29.99 KG/M2 | WEIGHT: 209 LBS | HEART RATE: 70 BPM

## 2025-07-18 DIAGNOSIS — J30.1 ALLERGIC RHINITIS DUE TO POLLEN, UNSPECIFIED SEASONALITY: ICD-10-CM

## 2025-07-18 DIAGNOSIS — L23.89 ALLERGIC CONTACT DERMATITIS DUE TO OTHER AGENTS: ICD-10-CM

## 2025-07-18 DIAGNOSIS — J30.89 ALLERGIC RHINITIS DUE TO OTHER ALLERGIC TRIGGER, UNSPECIFIED SEASONALITY: Primary | ICD-10-CM

## 2025-07-18 PROCEDURE — 95004 PERQ TESTS W/ALRGNC XTRCS: CPT | Performed by: ALLERGY & IMMUNOLOGY

## 2025-07-18 PROCEDURE — 99204 OFFICE O/P NEW MOD 45 MIN: CPT | Performed by: ALLERGY & IMMUNOLOGY

## 2025-07-18 NOTE — PROGRESS NOTES
Subjective   Patient ID:   [id number]   Eda Garcia is a 40 y.o. female who presents for Rash (In February had a bad rash that was cleared up with steroids.  Primary doctor did bloodwork to determine some allergies and she now practices dust mite avoidance but still having flare ups when she travels for work.  Does take pillow covers with her.  ).    Chief Complaint   Patient presents with    Rash     In February had a bad rash that was cleared up with steroids.  Primary doctor did bloodwork to determine some allergies and she now practices dust mite avoidance but still having flare ups when she travels for work.  Does take pillow covers with her.            Rash      This patient is here to evaluate for:  Allergic rhinitis and conjunctivitis   Rash starting December '24 and worse '25  She is used to sensitive skin and eczema rashes.     Neck and eyelid rash  Saw her PCP and given oral steroids  But every once in a while she has recurence.     Upper lids and lateral sides  Last time: 1 week ago.     Neck, by her shirtline mainly anteriorly;   Did bloodwork and dust mites were + so removed carpet,   Still flares when she travels.     Any changes in medication, diet, soap, detergents, fabric softener, or personal products:  She changed everything she was using on her face.  She travels with all her own products.     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   All other systems reviewed and are negative.        Objective     /87 (BP Location: Left arm, Patient Position: Sitting)   Pulse 70   Wt 94.8 kg (209 lb)   BMI 29.99 kg/m²      Physical Exam  Vitals and nursing note reviewed.   Constitutional:       Appearance: Normal appearance. She is normal weight.   HENT:      Head: Normocephalic and atraumatic.      Right Ear: External ear normal.      Left Ear: External ear normal.      Nose: No septal deviation, congestion or rhinorrhea.       Right Turbinates: Not enlarged, swollen or pale.      Left Turbinates: Not enlarged, swollen or pale.      Comments: Septum unremarkable without perforation or ulceration     Mouth/Throat:      Mouth: Mucous membranes are moist.     Eyes:      Extraocular Movements: Extraocular movements intact.      Conjunctiva/sclera: Conjunctivae normal.      Pupils: Pupils are equal, round, and reactive to light.       Cardiovascular:      Rate and Rhythm: Normal rate and regular rhythm.      Pulses: Normal pulses.      Heart sounds: Normal heart sounds.   Pulmonary:      Effort: Pulmonary effort is normal.      Breath sounds: Normal breath sounds. No wheezing, rhonchi or rales.     Musculoskeletal:         General: Normal range of motion.      Cervical back: Neck supple.     Skin:     General: Skin is warm and dry.      Findings: No erythema or rash.     Neurological:      General: No focal deficit present.      Mental Status: She is alert and oriented to person, place, and time.     Psychiatric:         Mood and Affect: Mood normal.         Behavior: Behavior normal.            Current Medications[1]    Summary of the labs over the past 6 months:    Office Visit on 05/13/2025   Component Date Value Ref Range Status    Case Report 05/13/2025    Final                    Value:Gynecologic Cytology                              Case: [id number]                                   Authorizing Provider:  Linnette Barnes MD           Collected:           05/13/2025 0922              Ordering Location:     [facility]    Received:            05/13/2025 0922              First Screen:          GIOVANI White                                                             Specimen:    ThinPrep Liquid-Based Pap-Imaging System Screen, CERVIX, SCREENING                         Final Cytological Interpretation 05/13/2025    Final                    Value:    A. THINPREP PAP CERVIX, SCREENING -     Specimen Adequacy  Satisfactory  for evaluation; endocervical/transformation zone component is present  Quality Indicator: Partially obscuring inflammation    General Categorization  Negative for intraepithelial lesion or malignancy.    Descriptive Interpretation  Negative for intraepithelial lesion or malignancy              05/13/2025    Final                    Value:Slide(s) initially screened by GIOVANI White at [facility] [address] [place] [address]  By the signature on this report, the individual or group listed as making the Final Interpretation/Diagnosis certifies that they have reviewed this case.       ThinPrep Imaging System 05/13/2025    Final                    Value:This specimen has been analyzed by the [employer] MobilePrep Imaging System ([employer] an automated imaging and review system, which assists the laboratory in evaluating cells on ThinPrep Pap tests. Following automated imaging, selected fields from every slide were reviewed by a cytotechnologist and/or pathologist.        Educational Note 05/13/2025    Final                    Value:Cervical cytology is a screening procedure primarily for squamous cancers and precursors and has associated false-negative and false-positives results as evidenced by published data. Your patient's test should be interpreted in this context, together with the patient's history and clinical findings. Regular sampling and follow-up of unexplained clinical signs and symptoms are recommended to minimize false negative results.      Perform HPV HR test? 05/13/2025 Always (all interpretations)   Final    Include HPV Genotype? 05/13/2025 Yes   Final    HPV, high-risk 05/13/2025 Negative  Negative Final    HPV Type 16 DNA 05/13/2025 Negative  Negative Final    HPV Type 18 DNA 05/13/2025 Negative  Negative Final    HPV non-Type 16 or 18 DNA 05/13/2025 Negative  Negative Final   Hospital Outpatient Visit on 03/10/2025   Component Date Value Ref Range Status    AV pk iris 03/10/2025  1.26  m/s Final    LVOT diam 03/10/2025 1.92  cm Final    MV E/A ratio 03/10/2025 0.89   Final    LA vol index A/L 03/10/2025 22.1  ml/m2 Final    Tricuspid annular plane systolic e* 03/10/2025 1.9  cm Final    LV EF 03/10/2025 60  % Final    RV free wall pk S' 03/10/2025 13.40  cm/s Final    LVIDd 03/10/2025 4.76  cm Final    Aortic Valve Area by Continuity of* 03/10/2025 2.54  cm2 Final    AV pk grad 03/10/2025 6  mmHg Final    LV A4C EF 03/10/2025 63.7   Final   Office Visit on 03/03/2025   Component Date Value Ref Range Status    DERMATOPHAGOIDES PTERONYSSINUS (D1* 03/04/2025 0.43 (H)  kU/L Final    CLASS 03/04/2025 1   Final    DERMATOPHAGOIDES FARINAE (D2) IGE 03/04/2025 0.70 (H)  kU/L Final    CLASS 03/04/2025 2   Final    PENICILLIUM NOTATUM (M1) IGE 03/04/2025 <0.10  kU/L Final    CLASS 03/04/2025 0   Final    CLADOSPORIUM HERBARUM (M2) IGE 03/04/2025 <0.10  kU/L Final    CLASS 03/04/2025 0   Final    ASPERGILLUS FUMIGATUS (M3) IGE 03/04/2025 <0.10  kU/L Final    CLASS 03/04/2025 0   Final    ALTERNARIA ALTERNATA (M6) IGE 03/04/2025 0.40 (H)  kU/L Final    CLASS 03/04/2025 1   Final    CAT DANDER (E1) IGE 03/04/2025 <0.10  kU/L Final    CLASS 03/04/2025 0   Final    DOG DANDER (E5) IGE 03/04/2025 <0.10  kU/L Final    CLASS 03/04/2025 0   Final    COCKROACH (I6) IGE 03/04/2025 0.10 (H)  kU/L Final    CLASS 03/04/2025 0/1   Final    MAPLE (BOX ELDER) (T1) IGE 03/04/2025 <0.10  kU/L Final    CLASS 03/04/2025 0   Final    BIRCH (T3) IGE 03/04/2025 <0.10  kU/L Final    CLASS 03/04/2025 0   Final    M[place] CEDAR (T6) IGE 03/04/2025 <0.10  kU/L Final    CLASS 03/04/2025 0   Final    [place] TREE (T10) IGE 03/04/2025 <0.10  kU/L Final    CLASS 03/04/2025 0   Final    [place] (T11) IGE 03/04/2025 <0.10  kU/L Final    CLASS 03/04/2025 0   Final    [place] (T14) IGE 03/04/2025 <0.10  kU/L Final    CLASS 03/04/2025 0   Final    WHITE CYNTHIA (T15) IGE 03/04/2025 <0.10  kU/L Final    CLASS 03/04/2025 0   Final    [place]  (T7) IGE 03/04/2025 <0.10  kU/L Final    CLASS 03/04/2025 0   Final    ELM (T8) IGE 03/04/2025 <0.10  kU/L Final    CLASS 03/04/2025 0   Final    HICKORY/PECAN TREE (T22) IGE 03/04/2025 <0.10  kU/L Final    CLASS 03/04/2025 0   Final    WHITE MULBERRY (T70) IGE 03/04/2025 <0.10  kU/L Final    CLASS 03/04/2025 0   Final    BERMUDA GRASS (G2) IGE 03/04/2025 <0.10  kU/L Final    CLASS 03/04/2025 0   Final    [place] GRASS (G6) IGE 03/04/2025 <0.10  kU/L Final    CLASS 03/04/2025 0   Final    COMMON RAGWEED (SHORT) (W1) IGE 03/04/2025 <0.10  kU/L Final    CLASS 03/04/2025 0   Final    ROUGH PIGWEED (W14) IGE 03/04/2025 <0.10  kU/L Final    CLASS 03/04/2025 0   Final    [place] THISTLE (W11) IGE 03/04/2025 <0.10  kU/L Final    CLASS 03/04/2025 0   Final    SHEEP SORREL (W18) IGE 03/04/2025 <0.10  kU/L Final    CLASS 03/04/2025 0   Final    MOUSE URINE PROTEINS (E72) IGE 03/04/2025 <0.10  kU/L Final    CLASS 03/04/2025 0   Final    IMMUNOGLOBULIN E 03/04/2025 157 (H)  <AG=422 kU/L Final    C-REACTIVE PROTEIN 03/04/2025 5.5  <8.0 mg/L Final    SED RATE BY MODIFIED WESTERGREN 03/04/2025 14  < OR = 20 mm/h Final    RHONDA SCREEN, IFA 03/04/2025 POSITIVE (A)  NEGATIVE Final    RHONDA TITER 03/04/2025 1:80 (H)  titer Final    RHONDA PATTERN 03/04/2025 Nuclear, Dense Fine Speckled (A)   Final    DNA (DS) ANTIBODY 03/04/2025 1  IU/mL Final    SM ANTIBODY 03/04/2025 <1.0 NEG  <1.0 NEG AI Final    SM/RNP ANTIBODY 03/04/2025 <1.0 NEG  <1.0 NEG AI Final    RNP ANTIBODY 03/04/2025 <1.0 NEG  <1.0 NEG AI Final    CHROMATIN (NUCLEOSOMAL) ANTIBODY 03/04/2025 <1.0 NEG  <1.0 NEG AI Final    SJOGREN'S ANTIBODY (SS-A) 03/04/2025 <1.0 NEG  <1.0 NEG AI Final    SJOGREN'S ANTIBODY (SS-B) 03/04/2025 <1.0 NEG  <1.0 NEG AI Final    SCL-70 ANTIBODY 03/04/2025 <1.0 NEG  <1.0 NEG AI Final    MAURICIO-1 ANTIBODY 03/04/2025 <1.0 NEG  <1.0 NEG AI Final    CENTROMERE B ANTIBODY 03/04/2025 <1.0 NEG  <1.0 NEG AI Final    RIBOSOMAL P ANTIBODY 03/04/2025 <1.0 NEG  <1.0 NEG  AI Final    INTERPRETATION 03/04/2025    Final    Allergen Interpretation 03/04/2025    Final   Patient Message on 02/26/2025   Component Date Value Ref Range Status    TSH W/REFLEX TO FT4 03/03/2025 1.37  mIU/L Final    CHOLESTEROL, TOTAL 03/03/2025 234 (H)  <200 mg/dL Final    HDL CHOLESTEROL 03/03/2025 70  > OR = 50 mg/dL Final    TRIGLYCERIDES 03/03/2025 115  <150 mg/dL Final    LDL-CHOLESTEROL 03/03/2025 141 (H)  mg/dL (calc) Final    CHOL/HDLC RATIO 03/03/2025 3.3  <5.0 (calc) Final    NON HDL CHOLESTEROL 03/03/2025 164 (H)  <130 mg/dL (calc) Final         Assessment/Plan   Diagnoses and all orders for this visit:  Allergic rhinitis due to other allergic trigger, unspecified seasonality  Allergic rhinitis due to pollen, unspecified seasonality  Allergic contact dermatitis due to other agents  Other orders  -     Referral to Allergy    It was a pleasure to meet you and we are happy to welcome you to our office. We would be happy to see you at either of our  office locations in [facility] or [place].    We performed allergy testing to help determine the etiology of your symptoms. We discussed the results of the testing. Also, we started to focus on treating your problem and we reviewed the management plan.    Agree with the avoidance measures and you have done an excellent job of dust mites avoidance.    This may also be related to a contact dermatitis.   I recommend checking for contact dermatitis with patch testing. The patient understands that the patch test cannot become wet so showers and heavy exercise need to be avoided. Also, please do not use any topical or oral steroids like prednisone as this can interfere with the testing results. But antihistamines for itching can be used. You should bring in any of your own creams, products, etc. that you would like us to also patch test.     Meds: contniue the prescriptions steroid cream. Also,, bring in this steroid cream that you are using. We can consider  elidel if interested as well (but it can cause burning).        Elmo Mcclure MD            [1]   Current Outpatient Medications   Medication Sig Dispense Refill    drospirenone, contraceptive, 4 mg (28) tablet Take 1 tablet by mouth once daily. 84 tablet 4    fluticasone (Flonase) 50 mcg/actuation nasal spray Administer 2 sprays into affected nostril(s) once daily.       No current facility-administered medications for this visit.